# Patient Record
Sex: FEMALE | Race: ASIAN | NOT HISPANIC OR LATINO | URBAN - METROPOLITAN AREA
[De-identification: names, ages, dates, MRNs, and addresses within clinical notes are randomized per-mention and may not be internally consistent; named-entity substitution may affect disease eponyms.]

---

## 2017-01-02 ENCOUNTER — EMERGENCY (EMERGENCY)
Facility: HOSPITAL | Age: 42
LOS: 1 days | Discharge: HOME | End: 2017-01-02
Admitting: EMERGENCY MEDICINE
Payer: COMMERCIAL

## 2017-01-02 VITALS
HEART RATE: 105 BPM | SYSTOLIC BLOOD PRESSURE: 136 MMHG | DIASTOLIC BLOOD PRESSURE: 90 MMHG | RESPIRATION RATE: 19 BRPM | OXYGEN SATURATION: 100 % | TEMPERATURE: 99 F

## 2017-01-02 VITALS
HEART RATE: 100 BPM | OXYGEN SATURATION: 100 % | SYSTOLIC BLOOD PRESSURE: 137 MMHG | RESPIRATION RATE: 18 BRPM | DIASTOLIC BLOOD PRESSURE: 84 MMHG

## 2017-01-02 LAB — HCG SERPL-ACNC: < 5 MIU/ML — SIGNIFICANT CHANGE UP

## 2017-01-02 PROCEDURE — 71020: CPT | Mod: 26

## 2017-01-02 PROCEDURE — 99283 EMERGENCY DEPT VISIT LOW MDM: CPT | Mod: 25

## 2017-01-02 RX ADMIN — Medication 100 MILLIGRAM(S): at 09:51

## 2017-01-02 NOTE — ED PROVIDER NOTE - PLAN OF CARE
Rest, drink plenty of fluids.  Advance activity as tolerated.  Continue all previously prescribed medications as directed.  Continue using Albuterol Inhaler 2 puffs every 4-6 hours as needed for shortness of breath or wheezing.  Take Tessalon Perles 100 mg three times a day as needed for cough -- causes drowsiness, no drinking alcohol or driving with this medication. Take Guiafenesin 600 mg twice a day as needed for chest congestion. Follow up with your primary care physician and pulmonologist in 48-72 hours- bring copies of your results.  Return to the ER for worsening or persistent symptoms, including SOB, fevers, chills.

## 2017-01-02 NOTE — ED PROVIDER NOTE - MEDICAL DECISION MAKING DETAILS
42 y/o M w/ PMHx of asthma, presenting to the ED c/o cough x1 month. Likely bronchitis// CXR, supportive care, pulmonary follow up

## 2017-01-02 NOTE — ED ADULT NURSE NOTE - OBJECTIVE STATEMENT
Patient brought in by EMS due to nonproductive coughing since 4pm getting worse. Patient saw PCP received zpack, and Symbicort. Patient took prednisone, completed the dose and still not feeling well. Patient has a pulmonologist, that she follows with for her asthma.

## 2017-01-02 NOTE — ED PROVIDER NOTE - PROGRESS NOTE DETAILS
ALMA DELIA Cronin:  Xray shows no acute pathology.  Pt without wheezing.  Pt ambulating without difficulty or SOB.  Pt to follow up with her PMD and her private pulmonologist.  Pt medically stable for discharge. The scribe's documentation has been prepared under my direction and personally reviewed by me in its entirety. I confirm that the note above accurately reflects all work, treatment, procedures, and medical decision making performed by me, Jose Cronin PA-C.

## 2017-01-02 NOTE — ED ADULT NURSE NOTE - CHIEF COMPLAINT QUOTE
Pt brought in by EMS for nonproductive cough for approx 1.5 weeks.  Pt reports sick contacts at home.  Pt received prednisone taper last week, finished course but not feeling better.  Respirations even and unlabored on room air.  Mask provided in triage.  Pt denies any PMHx.

## 2017-01-02 NOTE — ED ADULT TRIAGE NOTE - CHIEF COMPLAINT QUOTE
Pt brought in by EMS for nonproductive cough for approx 1.5 weeks.  Pt reports sick contacts at home.  Pt received prednisone taper last week, finished course but not feeling better.  Respirations even and unlabored on room air.  Pt denies any PMHx. Pt brought in by EMS for nonproductive cough for approx 1.5 weeks.  Pt reports sick contacts at home.  Pt received prednisone taper last week, finished course but not feeling better.  Respirations even and unlabored on room air.  Mask provided in triage.  Pt denies any PMHx.

## 2017-01-02 NOTE — ED PROVIDER NOTE - NS ED MD SCRIBE ATTENDING SCRIBE SECTIONS
HISTORY OF PRESENT ILLNESS/VITAL SIGNS( Pullset)/REVIEW OF SYSTEMS/HIV/PHYSICAL EXAM/PAST MEDICAL/SURGICAL/SOCIAL HISTORY/DISPOSITION

## 2017-01-02 NOTE — ED PROVIDER NOTE - OBJECTIVE STATEMENT
42 y/o F w/ PMHx of asthma (No hx of intubation, no hospitalizations), , presents to the ED c/o cough x1 month. Pt endorses sick contact w/ children w/ URI sx (cough, runny nose, sore throat), after pt developed cough, runny nose, sore throat. Pt saw PMD Dr. Jj, started on Azithromycin w/ mild relief, 1.5 weeks ago saw pulmonologist Dr. Almonte, who started pt on Symbicort and prednisone (completed 1 week ago). Pt presenting to ED today for persistent cough. Pt states she has been taking flonase daily for past 3 weeks. Pt denies wheezing, but took albuterol inhaler today w/ no relief. Denies fever, chills, nausea,  vomiting, CP, SOB, abd pain, body aches or any other complaints. NKDA.

## 2017-01-02 NOTE — ED PROVIDER NOTE - CARE PLAN
Principal Discharge DX:	Bronchitis  Instructions for follow-up, activity and diet:	Rest, drink plenty of fluids.  Advance activity as tolerated.  Continue all previously prescribed medications as directed.  Continue using Albuterol Inhaler 2 puffs every 4-6 hours as needed for shortness of breath or wheezing.  Take Tessalon Perles 100 mg three times a day as needed for cough -- causes drowsiness, no drinking alcohol or driving with this medication. Take Guiafenesin 600 mg twice a day as needed for chest congestion. Follow up with your primary care physician and pulmonologist in 48-72 hours- bring copies of your results.  Return to the ER for worsening or persistent symptoms, including SOB, fevers, chills.

## 2017-03-09 PROBLEM — Z00.00 ENCOUNTER FOR PREVENTIVE HEALTH EXAMINATION: Status: ACTIVE | Noted: 2017-03-09

## 2018-02-21 NOTE — ED ADULT NURSE NOTE - TEMPLATE
After Visit Summary   2018    Radha Kebede    MRN: [id number]           Patient Information     Date Of Birth          1970        Visit Information        Provider Department      2018 1:00 PM Lynette Infante APRN [facility]        Today's Diagnoses     Symptomatic menopausal or female climacteric states    -  1    Vaginal dryness, menopausal          Care Instructions    Continue with Rx through Dr Magallanes as discussed  Rx for Estring sent to [facility]  Return in the fall for full  well woman visit and medication assessment                Follow-ups after your visit        Follow-up notes from your care team     Return in 1 year (on 2019) for Preventative Health Visit.      Who to contact     Please call your clinic at [phone] to:    Ask questions about your health    Make or cancel appointments    Discuss your medicines    Learn about your test results    Speak to your doctor            Additional Information About Your Visit        MyChart Information     [employer] is an electronic gateway that provides easy, online access to your medical records. With [employer]uki, you can request a clinic appointment, read your test results, renew a prescription or communicate with your care team.     To sign up for [employer] visit the website at [email or web address]   You will be asked to enter the access code listed below, as well as some personal information. Please follow the directions to create your username and password.     Your access code is: [email or web address]  Expires: 2018  2:10 PM     Your access code will  in 90 days. If you need help or a new code, please contact your [facility] or call [phone] for assistance.        Care EveryWhere ID     This is your Care EveryWhere ID. This could be used by other organizations to access your [facility] medical records  [id number]        Your Vitals Were      "Pulse Height BMI (Body Mass Index)             71 1.702 m (5' 7\") 23.34 kg/m2          Blood Pressure from Last 3 Encounters:   02/21/18 111/74   11/16/17 102/68    Weight from Last 3 Encounters:   02/21/18 67.6 kg (149 lb)   11/16/17 65.8 kg (145 lb)              Today, you had the following     No orders found for display         Today's Medication Changes          These changes are accurate as of 2/21/18  2:10 PM.  If you have any questions, ask your nurse or doctor.               These medicines have changed or have updated prescriptions.        Dose/Directions    * estradiol 10 MCG Tabs vaginal tablet   Commonly known as:  VAGIFEM   This may have changed:  Another medication with the same name was added. Make sure you understand how and when to take each.   Used for:  Premature ovarian failure, Hormone replacement therapy   Changed by:  Lynette Infante APRN CNP        Dose:  10 mcg   Place 1 tablet (10 mcg) vaginally twice a week   Quantity:  8 tablet   Refills:  3       * estradiol 2 MG vaginal ring   Commonly known as:  ESTRING   This may have changed:  You were already taking a medication with the same name, and this prescription was added. Make sure you understand how and when to take each.   Used for:  Symptomatic menopausal or female climacteric states, Vaginal dryness, menopausal   Changed by:  Lynette Infante APRN CNP        Dose:  2 mg   Place 1 each (2 mg) vaginally every 3 months   Quantity:  1 each   Refills:  3       * Notice:  This list has 2 medication(s) that are the same as other medications prescribed for you. Read the directions carefully, and ask your doctor or other care provider to review them with you.         Where to get your medicines      These medications were sent to [id number] - [facility] [address] [place] [address] [address] [facility] [address]     Phone:  [phone]     estradiol 2 MG vaginal ring                Primary Care " Provider    None Specified       No primary provider on file.        Equal Access to Services     VIJI FORD : Hadii meagan Last, jannet baldwin, willa fleming. So [facility] [phone].    ATENCIÓN: Si habla español, tiene a cameron disposición servicios gratuitos de asistencia lingüística. Llame al [phone].    We comply with applicable federal civil rights laws and [place] laws. We do not discriminate on the basis of race, color, national origin, age, disability, sex, sexual orientation, or gender identity.            Thank you!     Thank you for choosing [facility]  for your care. Our goal is always to provide you with excellent care. Hearing back from our patients is one way we can continue to improve our services. Please take a few minutes to complete the written survey that you may receive in the mail after your visit with us. Thank you!             Your Updated Medication List - Protect others around you: Learn how to safely use, store and throw away your medicines at www.disposemymeds.org.          This list is accurate as of 2/21/18  2:10 PM.  Always use your most recent med list.                   Brand Name Dispense Instructions for use Diagnosis    ascorbic acid 1000 MG Tabs    vitamin C     Take 1,000 mg by mouth daily        COMPOUND CONTAINING CONTROLLED SUBSTANCE - PHARMACY TO MIX COMPOUNDED MEDICATION    CMPD RX     1 capsule daily Progesterone 100 mg, estriol 4 MG        * estradiol 10 MCG Tabs vaginal tablet    VAGIFEM    8 tablet    Place 1 tablet (10 mcg) vaginally twice a week    Premature ovarian failure, Hormone replacement therapy       * estradiol 2 MG vaginal ring    ESTRING    1 each    Place 1 each (2 mg) vaginally every 3 months    Symptomatic menopausal or female climacteric states, Vaginal dryness, menopausal       fish oil-omega-3 fatty acids 1000 MG capsule      Take 1 g by mouth daily Nordic  natural  DHA        MAGNESIUM MALATE PO      Take 800 mg by mouth daily        MULTIVITAMIN ADULT PO           * NUTRITIONAL SUPPLEMENT PO      Take 5,000 mg by mouth MTH Folate/ Vitamin B12        * NUTRITIONAL SUPPLEMENT PO      Take 1 capsule by mouth MRF 2        * NUTRITIONAL SUPPLEMENT PO      Take 200 mg by mouth daily S-acetyl Glutathione        PROBIOTIC DAILY PO      Take 1 capsule by mouth daily        vitamin D3 2000 UNITS Caps           * Notice:  This list has 5 medication(s) that are the same as other medications prescribed for you. Read the directions carefully, and ask your doctor or other care provider to review them with you.       Respiratory

## 2019-01-14 PROBLEM — J45.909 UNSPECIFIED ASTHMA, UNCOMPLICATED: Chronic | Status: ACTIVE | Noted: 2017-01-02

## 2019-01-22 ENCOUNTER — APPOINTMENT (OUTPATIENT)
Dept: ANTEPARTUM | Facility: CLINIC | Age: 44
End: 2019-01-22
Payer: COMMERCIAL

## 2019-01-22 ENCOUNTER — ASOB RESULT (OUTPATIENT)
Age: 44
End: 2019-01-22

## 2019-01-22 PROCEDURE — 36416 COLLJ CAPILLARY BLOOD SPEC: CPT

## 2019-01-22 PROCEDURE — 76813 OB US NUCHAL MEAS 1 GEST: CPT

## 2019-01-22 PROCEDURE — 76801 OB US < 14 WKS SINGLE FETUS: CPT

## 2019-03-11 ENCOUNTER — ASOB RESULT (OUTPATIENT)
Age: 44
End: 2019-03-11

## 2019-03-11 ENCOUNTER — APPOINTMENT (OUTPATIENT)
Dept: ANTEPARTUM | Facility: CLINIC | Age: 44
End: 2019-03-11
Payer: COMMERCIAL

## 2019-03-11 PROCEDURE — 36415 COLL VENOUS BLD VENIPUNCTURE: CPT

## 2019-03-11 PROCEDURE — 76811 OB US DETAILED SNGL FETUS: CPT

## 2019-03-11 PROCEDURE — 99213 OFFICE O/P EST LOW 20 MIN: CPT | Mod: 25

## 2019-04-08 ENCOUNTER — ASOB RESULT (OUTPATIENT)
Age: 44
End: 2019-04-08

## 2019-04-08 ENCOUNTER — APPOINTMENT (OUTPATIENT)
Dept: ANTEPARTUM | Facility: CLINIC | Age: 44
End: 2019-04-08
Payer: COMMERCIAL

## 2019-04-08 PROCEDURE — 99213 OFFICE O/P EST LOW 20 MIN: CPT | Mod: 25

## 2019-04-08 PROCEDURE — 76805 OB US >/= 14 WKS SNGL FETUS: CPT

## 2019-05-05 ENCOUNTER — OUTPATIENT (OUTPATIENT)
Dept: INPATIENT UNIT | Facility: HOSPITAL | Age: 44
LOS: 1 days | Discharge: HOME | End: 2019-05-05
Payer: COMMERCIAL

## 2019-05-05 VITALS — TEMPERATURE: 98 F

## 2019-05-05 VITALS
HEART RATE: 98 BPM | RESPIRATION RATE: 16 BRPM | TEMPERATURE: 98 F | SYSTOLIC BLOOD PRESSURE: 126 MMHG | DIASTOLIC BLOOD PRESSURE: 67 MMHG

## 2019-05-05 DIAGNOSIS — Z3A.00 WEEKS OF GESTATION OF PREGNANCY NOT SPECIFIED: ICD-10-CM

## 2019-05-05 DIAGNOSIS — O26.899 OTHER SPECIFIED PREGNANCY RELATED CONDITIONS, UNSPECIFIED TRIMESTER: ICD-10-CM

## 2019-05-05 LAB
APPEARANCE UR: CLEAR — SIGNIFICANT CHANGE UP
BACTERIA # UR AUTO: NEGATIVE — SIGNIFICANT CHANGE UP
BILIRUB UR-MCNC: NEGATIVE — SIGNIFICANT CHANGE UP
BLOOD UR QL VISUAL: NEGATIVE — SIGNIFICANT CHANGE UP
COLOR SPEC: COLORLESS — SIGNIFICANT CHANGE UP
GLUCOSE UR-MCNC: NEGATIVE — SIGNIFICANT CHANGE UP
HCT VFR BLD CALC: 34.9 % — SIGNIFICANT CHANGE UP (ref 34.5–45)
HGB BLD-MCNC: 10.8 G/DL — LOW (ref 11.5–15.5)
HYALINE CASTS # UR AUTO: NEGATIVE — SIGNIFICANT CHANGE UP
KETONES UR-MCNC: NEGATIVE — SIGNIFICANT CHANGE UP
LEUKOCYTE ESTERASE UR-ACNC: SIGNIFICANT CHANGE UP
MCHC RBC-ENTMCNC: 23.3 PG — LOW (ref 27–34)
MCHC RBC-ENTMCNC: 30.9 % — LOW (ref 32–36)
MCV RBC AUTO: 75.4 FL — LOW (ref 80–100)
NITRITE UR-MCNC: NEGATIVE — SIGNIFICANT CHANGE UP
NRBC # FLD: 0 K/UL — SIGNIFICANT CHANGE UP (ref 0–0)
PH UR: 7.5 — SIGNIFICANT CHANGE UP (ref 5–8)
PLATELET # BLD AUTO: 211 K/UL — SIGNIFICANT CHANGE UP (ref 150–400)
PMV BLD: SIGNIFICANT CHANGE UP FL (ref 7–13)
PROT UR-MCNC: NEGATIVE — SIGNIFICANT CHANGE UP
RBC # BLD: 4.63 M/UL — SIGNIFICANT CHANGE UP (ref 3.8–5.2)
RBC # FLD: 13.7 % — SIGNIFICANT CHANGE UP (ref 10.3–14.5)
RBC CASTS # UR COMP ASSIST: SIGNIFICANT CHANGE UP (ref 0–?)
SP GR SPEC: 1.01 — SIGNIFICANT CHANGE UP (ref 1–1.04)
SQUAMOUS # UR AUTO: SIGNIFICANT CHANGE UP
UROBILINOGEN FLD QL: NORMAL — SIGNIFICANT CHANGE UP
WBC # BLD: 13.41 K/UL — HIGH (ref 3.8–10.5)
WBC # FLD AUTO: 13.41 K/UL — HIGH (ref 3.8–10.5)
WBC UR QL: HIGH (ref 0–?)

## 2019-05-05 PROCEDURE — 59025 FETAL NON-STRESS TEST: CPT | Mod: 26

## 2019-05-05 NOTE — OB PROVIDER TRIAGE NOTE - NSOBPROVIDERNOTE_OBGYN_ALL_OB_FT
44 year old female  at 27.6 wks  VB   polyp noted on exam    no acute VB   cx length normal      occassional contrxs     case d/w Dr Guerrero      will send CBC   po hydration and monitoring 44 year old female  at 27.6 wks  VB   polyp noted on exam    no acute VB   cx length normal      occassional contrxs     case d/w Dr Guerrero      will send CBC   po hydration and monitoring   1530p CBC normal WBC   UA negative    feels better no VB    discharge home with instructions   KERRY FLANNERY

## 2019-05-05 NOTE — OB PROVIDER TRIAGE NOTE - HISTORY OF PRESENT ILLNESS
44 year old female P2 EDC 7/29/19 ay 27.6 wks who presents from office after being seen for vaginal spotting which has been intermittent for last 10 days and denied any saturated pads/clothing    also feels intermittent cramping since spotting began and denied any contractions denied any prior hx of PTL  denied any hx of placental issues.    denied any prior hx of polyp  noted on exam today    phone consult with Dr Guerrero  noted to be 4cm and protruding thru cx  wants MFM consult     pt denied any fever chills dysuria   denied any vaginal discharge except brownish spotting especially at night  or odor      OB hx CSx2

## 2019-05-05 NOTE — OB PROVIDER TRIAGE NOTE - NSHPPHYSICALEXAM_GEN_ALL_CORE
pt seen    SSE  4cm polyp noted protruding thru os  no active vb noted    TVS  cx length  3.7 cm   no previa    abd scan vtx aafi bpp 8/8     nst irreg contrxs      examined with Dr Garcia

## 2019-05-05 NOTE — OB PROVIDER TRIAGE NOTE - FINDINGS/TREATMENT
cx polyp noted  no active Vb  for continued  observation continued observation and monitoring    sonogram follow up 5/6/19

## 2019-05-06 ENCOUNTER — ASOB RESULT (OUTPATIENT)
Age: 44
End: 2019-05-06

## 2019-05-06 ENCOUNTER — APPOINTMENT (OUTPATIENT)
Dept: ANTEPARTUM | Facility: CLINIC | Age: 44
End: 2019-05-06
Payer: COMMERCIAL

## 2019-05-06 ENCOUNTER — OUTPATIENT (OUTPATIENT)
Dept: OUTPATIENT SERVICES | Facility: HOSPITAL | Age: 44
LOS: 1 days | End: 2019-05-06

## 2019-05-06 PROCEDURE — 76805 OB US >/= 14 WKS SNGL FETUS: CPT

## 2019-05-06 PROCEDURE — 76817 TRANSVAGINAL US OBSTETRIC: CPT

## 2019-05-06 PROCEDURE — 76818 FETAL BIOPHYS PROFILE W/NST: CPT | Mod: 26

## 2019-05-13 ENCOUNTER — APPOINTMENT (OUTPATIENT)
Dept: ANTEPARTUM | Facility: CLINIC | Age: 44
End: 2019-05-13
Payer: COMMERCIAL

## 2019-05-13 ENCOUNTER — ASOB RESULT (OUTPATIENT)
Age: 44
End: 2019-05-13

## 2019-05-13 ENCOUNTER — APPOINTMENT (OUTPATIENT)
Dept: ANTEPARTUM | Facility: HOSPITAL | Age: 44
End: 2019-05-13

## 2019-05-13 ENCOUNTER — OUTPATIENT (OUTPATIENT)
Dept: OUTPATIENT SERVICES | Facility: HOSPITAL | Age: 44
LOS: 1 days | End: 2019-05-13

## 2019-05-13 PROCEDURE — 76818 FETAL BIOPHYS PROFILE W/NST: CPT | Mod: 26

## 2019-05-16 DIAGNOSIS — O99.112 OTHER DISEASES OF THE BLOOD AND BLOOD-FORMING ORGANS AND CERTAIN DISORDERS INVOLVING THE IMMUNE MECHANISM COMPLICATING PREGNANCY, SECOND TRIMESTER: ICD-10-CM

## 2019-05-16 DIAGNOSIS — O34.43 MATERNAL CARE FOR OTHER ABNORMALITIES OF CERVIX, THIRD TRIMESTER: ICD-10-CM

## 2019-05-16 DIAGNOSIS — O09.522 SUPERVISION OF ELDERLY MULTIGRAVIDA, SECOND TRIMESTER: ICD-10-CM

## 2019-05-16 DIAGNOSIS — O40.3XX0 POLYHYDRAMNIOS, THIRD TRIMESTER, NOT APPLICABLE OR UNSPECIFIED: ICD-10-CM

## 2019-05-21 ENCOUNTER — APPOINTMENT (OUTPATIENT)
Dept: ANTEPARTUM | Facility: HOSPITAL | Age: 44
End: 2019-05-21

## 2019-05-21 ENCOUNTER — APPOINTMENT (OUTPATIENT)
Dept: MATERNAL FETAL MEDICINE | Facility: CLINIC | Age: 44
End: 2019-05-21
Payer: COMMERCIAL

## 2019-05-21 ENCOUNTER — APPOINTMENT (OUTPATIENT)
Dept: ANTEPARTUM | Facility: CLINIC | Age: 44
End: 2019-05-21
Payer: COMMERCIAL

## 2019-05-21 ENCOUNTER — ASOB RESULT (OUTPATIENT)
Age: 44
End: 2019-05-21

## 2019-05-21 ENCOUNTER — OUTPATIENT (OUTPATIENT)
Dept: OUTPATIENT SERVICES | Facility: HOSPITAL | Age: 44
LOS: 1 days | End: 2019-05-21

## 2019-05-21 DIAGNOSIS — O40.3XX0 POLYHYDRAMNIOS, THIRD TRIMESTER, NOT APPLICABLE OR UNSPECIFIED: ICD-10-CM

## 2019-05-21 DIAGNOSIS — O34.43 MATERNAL CARE FOR OTHER ABNORMALITIES OF CERVIX, THIRD TRIMESTER: ICD-10-CM

## 2019-05-21 DIAGNOSIS — O09.522 SUPERVISION OF ELDERLY MULTIGRAVIDA, SECOND TRIMESTER: ICD-10-CM

## 2019-05-21 DIAGNOSIS — O24.410 GESTATIONAL DIABETES MELLITUS IN PREGNANCY, DIET CONTROLLED: ICD-10-CM

## 2019-05-21 PROCEDURE — 76818 FETAL BIOPHYS PROFILE W/NST: CPT | Mod: 26

## 2019-05-21 PROCEDURE — G0109 DIAB MANAGE TRN IND/GROUP: CPT

## 2019-05-22 RX ORDER — BLOOD-GLUCOSE METER
KIT MISCELLANEOUS
Qty: 1 | Refills: 0 | Status: ACTIVE | COMMUNITY
Start: 2019-05-21 | End: 1900-01-01

## 2019-05-22 RX ORDER — LANCETS 33 GAUGE
EACH MISCELLANEOUS
Qty: 4 | Refills: 0 | Status: ACTIVE | COMMUNITY
Start: 2019-05-21 | End: 1900-01-01

## 2019-05-22 RX ORDER — BLOOD-GLUCOSE METER
KIT MISCELLANEOUS 4 TIMES DAILY
Qty: 4 | Refills: 0 | Status: ACTIVE | COMMUNITY
Start: 2019-05-21 | End: 1900-01-01

## 2019-05-28 ENCOUNTER — ASOB RESULT (OUTPATIENT)
Age: 44
End: 2019-05-28

## 2019-05-28 ENCOUNTER — APPOINTMENT (OUTPATIENT)
Dept: ANTEPARTUM | Facility: HOSPITAL | Age: 44
End: 2019-05-28

## 2019-05-28 ENCOUNTER — OUTPATIENT (OUTPATIENT)
Dept: OUTPATIENT SERVICES | Facility: HOSPITAL | Age: 44
LOS: 1 days | End: 2019-05-28

## 2019-05-28 ENCOUNTER — APPOINTMENT (OUTPATIENT)
Dept: ANTEPARTUM | Facility: CLINIC | Age: 44
End: 2019-05-28
Payer: COMMERCIAL

## 2019-05-28 PROCEDURE — 76816 OB US FOLLOW-UP PER FETUS: CPT

## 2019-05-28 PROCEDURE — 76818 FETAL BIOPHYS PROFILE W/NST: CPT | Mod: 26

## 2019-06-04 DIAGNOSIS — O35.8XX0 MATERNAL CARE FOR OTHER (SUSPECTED) FETAL ABNORMALITY AND DAMAGE, NOT APPLICABLE OR UNSPECIFIED: ICD-10-CM

## 2019-06-04 DIAGNOSIS — O99.112 OTHER DISEASES OF THE BLOOD AND BLOOD-FORMING ORGANS AND CERTAIN DISORDERS INVOLVING THE IMMUNE MECHANISM COMPLICATING PREGNANCY, SECOND TRIMESTER: ICD-10-CM

## 2019-06-04 DIAGNOSIS — O99.213 OBESITY COMPLICATING PREGNANCY, THIRD TRIMESTER: ICD-10-CM

## 2019-06-04 DIAGNOSIS — O40.3XX0 POLYHYDRAMNIOS, THIRD TRIMESTER, NOT APPLICABLE OR UNSPECIFIED: ICD-10-CM

## 2019-06-04 DIAGNOSIS — O09.522 SUPERVISION OF ELDERLY MULTIGRAVIDA, SECOND TRIMESTER: ICD-10-CM

## 2019-06-05 ENCOUNTER — ASOB RESULT (OUTPATIENT)
Age: 44
End: 2019-06-05

## 2019-06-05 ENCOUNTER — OUTPATIENT (OUTPATIENT)
Dept: OUTPATIENT SERVICES | Facility: HOSPITAL | Age: 44
LOS: 1 days | Discharge: HOME | End: 2019-06-05
Payer: COMMERCIAL

## 2019-06-05 ENCOUNTER — APPOINTMENT (OUTPATIENT)
Dept: ANTEPARTUM | Facility: HOSPITAL | Age: 44
End: 2019-06-05

## 2019-06-05 VITALS
RESPIRATION RATE: 18 BRPM | HEART RATE: 92 BPM | SYSTOLIC BLOOD PRESSURE: 143 MMHG | DIASTOLIC BLOOD PRESSURE: 69 MMHG | TEMPERATURE: 98 F

## 2019-06-05 VITALS — OXYGEN SATURATION: 100 % | HEART RATE: 90 BPM

## 2019-06-05 DIAGNOSIS — O26.899 OTHER SPECIFIED PREGNANCY RELATED CONDITIONS, UNSPECIFIED TRIMESTER: ICD-10-CM

## 2019-06-05 DIAGNOSIS — Z3A.00 WEEKS OF GESTATION OF PREGNANCY NOT SPECIFIED: ICD-10-CM

## 2019-06-05 LAB
APPEARANCE UR: CLEAR — SIGNIFICANT CHANGE UP
BACTERIA # UR AUTO: SIGNIFICANT CHANGE UP
BILIRUB UR-MCNC: NEGATIVE — SIGNIFICANT CHANGE UP
BLOOD UR QL VISUAL: HIGH
COLOR SPEC: SIGNIFICANT CHANGE UP
GLUCOSE UR-MCNC: NEGATIVE — SIGNIFICANT CHANGE UP
HYALINE CASTS # UR AUTO: NEGATIVE — SIGNIFICANT CHANGE UP
KETONES UR-MCNC: NEGATIVE — SIGNIFICANT CHANGE UP
LEUKOCYTE ESTERASE UR-ACNC: SIGNIFICANT CHANGE UP
NITRITE UR-MCNC: NEGATIVE — SIGNIFICANT CHANGE UP
PH UR: 7 — SIGNIFICANT CHANGE UP (ref 5–8)
PROT UR-MCNC: NEGATIVE — SIGNIFICANT CHANGE UP
RBC CASTS # UR COMP ASSIST: SIGNIFICANT CHANGE UP (ref 0–?)
SP GR SPEC: 1.01 — SIGNIFICANT CHANGE UP (ref 1–1.04)
SQUAMOUS # UR AUTO: SIGNIFICANT CHANGE UP
UROBILINOGEN FLD QL: NORMAL — SIGNIFICANT CHANGE UP
WBC UR QL: HIGH (ref 0–?)

## 2019-06-05 PROCEDURE — 93010 ELECTROCARDIOGRAM REPORT: CPT

## 2019-06-05 RX ORDER — SODIUM CHLORIDE 9 MG/ML
1000 INJECTION, SOLUTION INTRAVENOUS
Refills: 0 | Status: DISCONTINUED | OUTPATIENT
Start: 2019-06-05 | End: 2019-06-20

## 2019-06-05 NOTE — OB PROVIDER TRIAGE NOTE - NSHPPHYSICALEXAM_GEN_ALL_CORE
Assessment reveals VSS  Abdomen soft, NT, gravid   Gold ball sized pendulous polyp protruding from introitus     Dr. Salcedo at bedside evaluating pt  VE: closed and long      PLAN: r/o PTL           NST           ATU sonogram           Finger stick-106           Fluid

## 2019-06-05 NOTE — OB PROVIDER TRIAGE NOTE - NS_OBGYNHISTORY_OBGYN_ALL_OB_FT
GYN: cervical polyp dx 5/5/19  OB: C/S 3/20/02, FT, 8#, failure to decend       C/S 5/26/2006, FT, 5#, repeat in labor at 37wks      AP course complicated by GDMA1-Metformin   Poly of Bryanna 29 as per patient last week

## 2019-06-05 NOTE — CONSULT NOTE ADULT - ASSESSMENT
EKG - pending   Echo  - Normal LV function ( out patient )       a/p     1) Palpitations - heart rate normal , will get 12 lead EKG, likely benign, no presyncope or syncope, 2d echo 4 months ago shows normal LV function     2) Vaginal bleed - likely sec to polyp f/u OB     3) Contractions - OB ruling out  labor

## 2019-06-05 NOTE — OB PROVIDER TRIAGE NOTE - HISTORY OF PRESENT ILLNESS
43y/o  @32.2wks presents with "spotting from polyp that is coming out when she wiped". Pt also reports on and off abdominal pain [id number]. Pain scale 5/10, feels them [id number].  Reports good fetal movement.  Denies LOF and VB    Allergies: Denies  Medications: PNV, Metformin 500 QD    Medical HX: Asthma, no hospitalizations or intubations  Surgical HX: C/S x2  Denies PSY/Etoh/Drugs/Smoke

## 2019-06-05 NOTE — OB PROVIDER TRIAGE NOTE - NSOBPROVIDERNOTE_OBGYN_ALL_OB_FT
Dr. Sierra at bedside, cardiology, came because he saw his patient on census. pt has hx of palpitations, ekg requested, WNL

## 2019-06-05 NOTE — CONSULT NOTE ADULT - SUBJECTIVE AND OBJECTIVE BOX
Dandre Escalante MD  Interventional Cardiology / Advance Heart Failure and Cardiac Transplant Specialist  [place] Office : [address] [place] [address] [place] [address]  Tel:   [place] Office : [address] [facility] [place] [address]  Tel: [phone]  Cell : [phone]    HISTORY OF PRESENT ILLNESS:  Pt is a 43 yo female comes in for vaginal bleeding, sec to possible cervical polyp , also complains of contractions. Pt came to me as an out pt in march, sec to SOB, workup including 2D echo was normal , pt currently complains of some palpitations, no presyncope , no dizziness, no SOB no chest pains      PAST MEDICAL & SURGICAL HISTORY:    	    MEDICATIONS:              lactated ringers. 1000 milliLiter(s) IV Continuous <Continuous>      FAMILY HISTORY:        Allergies    No Known Allergies    Intolerances    	      PHYSICAL EXAM:  T(C): 36.5 (06-05-19 @ 10:16), Max: 36.5 (06-05-19 @ 10:09)  HR: 80 (06-05-19 @ 12:54) (73 - 92)  BP: 133/68 (06-05-19 @ 12:51) (105/72 - 143/69)  RR: 18 (06-05-19 @ 10:09) (18 - 18)  SpO2: 100% (06-05-19 @ 12:49) (100% - 100%)  Wt(kg): --  I&O's Summary        HEENT:   Normal oral mucosa, PERRL, EOMI	  Cardiovascular: Normal S1 S2, No JVD, No murmurs, No edema  Respiratory: Lungs clear to auscultation	  Gastrointestinal: gravid uterus  Extremities: no edema    LABS:	 	    CARDIAC MARKERS:                    proBNP:   Lipid Profile:   HgA1c:   TSH:     ASSESSMENT/PLAN:

## 2019-06-05 NOTE — OB PROVIDER TRIAGE NOTE - FINDINGS/TREATMENT
D/C Home  D/W Dr. Cornell  Enlarged cervical polyp  Normal fetal testing  No evidence of PTL at this time  Follow up at next prenatal visit either this Sunday or Tuesday for follow up of polyp  Return for vaginal bleeding, loss of fluid or decreased fetal movement  Signs and symptoms of labor reviewed

## 2019-06-05 NOTE — OB PROVIDER TRIAGE NOTE - ADDITIONAL INSTRUCTIONS
Follow up at next prenatal visit either this Sunday or Tuesday for follow up of polyp  Return for vaginal bleeding, loss of fluid or decreased fetal movement  Signs and symptoms of labor reviewed

## 2019-06-05 NOTE — OB PROVIDER TRIAGE NOTE - NSHPLABSRESULTS_GEN_ALL_CORE
Urinalysis Basic - ( 2019 10:45 )    Color: LIGHT YELLOW / Appearance: CLEAR / S.007 / pH: 7.0  Gluc: NEGATIVE / Ketone: NEGATIVE  / Bili: NEGATIVE / Urobili: NORMAL   Blood: MODERATE / Protein: NEGATIVE / Nitrite: NEGATIVE   Leuk Esterase: LARGE / RBC: 3-5 / WBC 6-10   Sq Epi: FEW / Non Sq Epi: x / Bacteria: FEW        Ultrasound ATU: bpp 8/8, briana 22

## 2019-06-06 LAB — SPECIMEN SOURCE: SIGNIFICANT CHANGE UP

## 2019-06-07 DIAGNOSIS — O09.522 SUPERVISION OF ELDERLY MULTIGRAVIDA, SECOND TRIMESTER: ICD-10-CM

## 2019-06-07 DIAGNOSIS — O34.43 MATERNAL CARE FOR OTHER ABNORMALITIES OF CERVIX, THIRD TRIMESTER: ICD-10-CM

## 2019-06-07 DIAGNOSIS — O34.211 MATERNAL CARE FOR LOW TRANSVERSE SCAR FROM PREVIOUS CESAREAN DELIVERY: ICD-10-CM

## 2019-06-07 DIAGNOSIS — O35.8XX0 MATERNAL CARE FOR OTHER (SUSPECTED) FETAL ABNORMALITY AND DAMAGE, NOT APPLICABLE OR UNSPECIFIED: ICD-10-CM

## 2019-06-07 DIAGNOSIS — O24.415 GESTATIONAL DIABETES MELLITUS IN PREGNANCY, CONTROLLED BY ORAL HYPOGLYCEMIC DRUGS: ICD-10-CM

## 2019-06-07 DIAGNOSIS — O40.3XX0 POLYHYDRAMNIOS, THIRD TRIMESTER, NOT APPLICABLE OR UNSPECIFIED: ICD-10-CM

## 2019-06-07 LAB
-  CEFAZOLIN: SIGNIFICANT CHANGE UP
-  CIPROFLOXACIN: SIGNIFICANT CHANGE UP
-  GENTAMICIN: SIGNIFICANT CHANGE UP
-  LEVOFLOXACIN: SIGNIFICANT CHANGE UP
-  OXACILLIN: SIGNIFICANT CHANGE UP
-  PENICILLIN: SIGNIFICANT CHANGE UP
-  RIFAMPIN.: SIGNIFICANT CHANGE UP
-  TETRACYCLINE: SIGNIFICANT CHANGE UP
-  TRIMETHOPRIM/SULFAMETHOXAZOLE: SIGNIFICANT CHANGE UP
-  VANCOMYCIN: SIGNIFICANT CHANGE UP
BACTERIA UR CULT: SIGNIFICANT CHANGE UP
METHOD TYPE: SIGNIFICANT CHANGE UP
ORGANISM # SPEC MICROSCOPIC CNT: SIGNIFICANT CHANGE UP
ORGANISM # SPEC MICROSCOPIC CNT: SIGNIFICANT CHANGE UP

## 2019-06-10 ENCOUNTER — APPOINTMENT (OUTPATIENT)
Dept: ANTEPARTUM | Facility: HOSPITAL | Age: 44
End: 2019-06-10

## 2019-06-10 ENCOUNTER — OUTPATIENT (OUTPATIENT)
Dept: OUTPATIENT SERVICES | Facility: HOSPITAL | Age: 44
LOS: 1 days | End: 2019-06-10

## 2019-06-10 ENCOUNTER — APPOINTMENT (OUTPATIENT)
Dept: ANTEPARTUM | Facility: CLINIC | Age: 44
End: 2019-06-10
Payer: COMMERCIAL

## 2019-06-10 ENCOUNTER — ASOB RESULT (OUTPATIENT)
Age: 44
End: 2019-06-10

## 2019-06-10 PROCEDURE — 76818 FETAL BIOPHYS PROFILE W/NST: CPT | Mod: 26

## 2019-06-14 DIAGNOSIS — O10.013 PRE-EXISTING ESSENTIAL HYPERTENSION COMPLICATING PREGNANCY, THIRD TRIMESTER: ICD-10-CM

## 2019-06-14 DIAGNOSIS — O41.8X30 OTHER SPECIFIED DISORDERS OF AMNIOTIC FLUID AND MEMBRANES, THIRD TRIMESTER, NOT APPLICABLE OR UNSPECIFIED: ICD-10-CM

## 2019-06-14 DIAGNOSIS — O09.523 SUPERVISION OF ELDERLY MULTIGRAVIDA, THIRD TRIMESTER: ICD-10-CM

## 2019-06-14 DIAGNOSIS — O34.43 MATERNAL CARE FOR OTHER ABNORMALITIES OF CERVIX, THIRD TRIMESTER: ICD-10-CM

## 2019-06-14 DIAGNOSIS — O24.415 GESTATIONAL DIABETES MELLITUS IN PREGNANCY, CONTROLLED BY ORAL HYPOGLYCEMIC DRUGS: ICD-10-CM

## 2019-06-17 ENCOUNTER — OUTPATIENT (OUTPATIENT)
Dept: OUTPATIENT SERVICES | Facility: HOSPITAL | Age: 44
LOS: 1 days | End: 2019-06-17

## 2019-06-17 ENCOUNTER — APPOINTMENT (OUTPATIENT)
Dept: ANTEPARTUM | Facility: HOSPITAL | Age: 44
End: 2019-06-17

## 2019-06-17 ENCOUNTER — ASOB RESULT (OUTPATIENT)
Age: 44
End: 2019-06-17

## 2019-06-17 ENCOUNTER — APPOINTMENT (OUTPATIENT)
Dept: ANTEPARTUM | Facility: CLINIC | Age: 44
End: 2019-06-17
Payer: COMMERCIAL

## 2019-06-17 PROCEDURE — 76818 FETAL BIOPHYS PROFILE W/NST: CPT | Mod: 26

## 2019-06-17 PROCEDURE — 76816 OB US FOLLOW-UP PER FETUS: CPT

## 2019-06-18 ENCOUNTER — RESULT REVIEW (OUTPATIENT)
Age: 44
End: 2019-06-18

## 2019-06-19 ENCOUNTER — INPATIENT (INPATIENT)
Facility: HOSPITAL | Age: 44
LOS: 3 days | Discharge: HOME | End: 2019-06-23
Attending: OBSTETRICS & GYNECOLOGY | Admitting: OBSTETRICS & GYNECOLOGY
Payer: COMMERCIAL

## 2019-06-19 ENCOUNTER — TRANSCRIPTION ENCOUNTER (OUTPATIENT)
Age: 44
End: 2019-06-19

## 2019-06-19 VITALS
HEART RATE: 91 BPM | WEIGHT: 181.88 LBS | SYSTOLIC BLOOD PRESSURE: 142 MMHG | HEIGHT: 60 IN | TEMPERATURE: 98 F | DIASTOLIC BLOOD PRESSURE: 69 MMHG | RESPIRATION RATE: 18 BRPM

## 2019-06-19 DIAGNOSIS — Z3A.00 WEEKS OF GESTATION OF PREGNANCY NOT SPECIFIED: ICD-10-CM

## 2019-06-19 DIAGNOSIS — O26.899 OTHER SPECIFIED PREGNANCY RELATED CONDITIONS, UNSPECIFIED TRIMESTER: ICD-10-CM

## 2019-06-19 PROCEDURE — 88302 TISSUE EXAM BY PATHOLOGIST: CPT | Mod: 26

## 2019-06-19 RX ORDER — SODIUM CHLORIDE 9 MG/ML
1000 INJECTION, SOLUTION INTRAVENOUS
Refills: 0 | Status: DISCONTINUED | OUTPATIENT
Start: 2019-06-19 | End: 2019-06-19

## 2019-06-19 RX ORDER — IBUPROFEN 200 MG
600 TABLET ORAL EVERY 6 HOURS
Refills: 0 | Status: DISCONTINUED | OUTPATIENT
Start: 2019-06-21 | End: 2019-06-22

## 2019-06-19 RX ORDER — OXYTOCIN 10 UNIT/ML
333.33 VIAL (ML) INJECTION
Qty: 20 | Refills: 0 | Status: DISCONTINUED | OUTPATIENT
Start: 2019-06-19 | End: 2019-06-20

## 2019-06-19 RX ORDER — OXYCODONE HYDROCHLORIDE 5 MG/1
5 TABLET ORAL ONCE
Refills: 0 | Status: DISCONTINUED | OUTPATIENT
Start: 2019-06-21 | End: 2019-06-22

## 2019-06-19 RX ORDER — OXYCODONE HYDROCHLORIDE 5 MG/1
5 TABLET ORAL
Refills: 0 | Status: DISCONTINUED | OUTPATIENT
Start: 2019-06-21 | End: 2019-06-22

## 2019-06-19 RX ORDER — CITRIC ACID/SODIUM CITRATE 300-500 MG
30 SOLUTION, ORAL ORAL ONCE
Refills: 0 | Status: COMPLETED | OUTPATIENT
Start: 2019-06-19 | End: 2019-06-20

## 2019-06-19 RX ORDER — SODIUM CHLORIDE 9 MG/ML
1000 INJECTION, SOLUTION INTRAVENOUS ONCE
Refills: 0 | Status: COMPLETED | OUTPATIENT
Start: 2019-06-19 | End: 2019-06-20

## 2019-06-19 RX ORDER — METOCLOPRAMIDE HCL 10 MG
10 TABLET ORAL ONCE
Refills: 0 | Status: COMPLETED | OUTPATIENT
Start: 2019-06-19 | End: 2019-06-20

## 2019-06-19 RX ORDER — SODIUM CHLORIDE 9 MG/ML
1000 INJECTION, SOLUTION INTRAVENOUS
Refills: 0 | Status: DISCONTINUED | OUTPATIENT
Start: 2019-06-19 | End: 2019-06-20

## 2019-06-19 RX ORDER — SODIUM CHLORIDE 9 MG/ML
1000 INJECTION, SOLUTION INTRAVENOUS ONCE
Refills: 0 | Status: DISCONTINUED | OUTPATIENT
Start: 2019-06-19 | End: 2019-06-19

## 2019-06-19 RX ORDER — FAMOTIDINE 10 MG/ML
20 INJECTION INTRAVENOUS ONCE
Refills: 0 | Status: COMPLETED | OUTPATIENT
Start: 2019-06-19 | End: 2019-06-20

## 2019-06-19 RX ORDER — AMPICILLIN TRIHYDRATE 250 MG
2 CAPSULE ORAL EVERY 6 HOURS
Refills: 0 | Status: DISCONTINUED | OUTPATIENT
Start: 2019-06-19 | End: 2019-06-20

## 2019-06-19 RX ORDER — TETANUS TOXOID, REDUCED DIPHTHERIA TOXOID AND ACELLULAR PERTUSSIS VACCINE, ADSORBED 5; 2.5; 8; 8; 2.5 [IU]/.5ML; [IU]/.5ML; UG/.5ML; UG/.5ML; UG/.5ML
0.5 SUSPENSION INTRAMUSCULAR ONCE
Refills: 0 | Status: DISCONTINUED | OUTPATIENT
Start: 2019-06-21 | End: 2019-06-22

## 2019-06-19 RX ORDER — CITRIC ACID/SODIUM CITRATE 300-500 MG
30 SOLUTION, ORAL ORAL ONCE
Refills: 0 | Status: DISCONTINUED | OUTPATIENT
Start: 2019-06-19 | End: 2019-06-19

## 2019-06-19 RX ORDER — FAMOTIDINE 10 MG/ML
20 INJECTION INTRAVENOUS ONCE
Refills: 0 | Status: DISCONTINUED | OUTPATIENT
Start: 2019-06-19 | End: 2019-06-19

## 2019-06-19 RX ORDER — METOCLOPRAMIDE HCL 10 MG
10 TABLET ORAL ONCE
Refills: 0 | Status: DISCONTINUED | OUTPATIENT
Start: 2019-06-19 | End: 2019-06-19

## 2019-06-19 RX ORDER — OXYTOCIN 10 UNIT/ML
VIAL (ML) INJECTION
Qty: 20 | Refills: 0 | Status: DISCONTINUED | OUTPATIENT
Start: 2019-06-19 | End: 2019-06-19

## 2019-06-19 RX ORDER — SODIUM CHLORIDE 9 MG/ML
1000 INJECTION INTRAMUSCULAR; INTRAVENOUS; SUBCUTANEOUS
Refills: 0 | Status: DISCONTINUED | OUTPATIENT
Start: 2019-06-19 | End: 2019-06-20

## 2019-06-19 RX ADMIN — SODIUM CHLORIDE 125 MILLILITER(S): 9 INJECTION, SOLUTION INTRAVENOUS at 21:55

## 2019-06-19 RX ADMIN — Medication 216 GRAM(S): at 20:34

## 2019-06-19 RX ADMIN — Medication 12 MILLIGRAM(S): at 20:34

## 2019-06-19 NOTE — OB PROVIDER H&P - ASSESSMENT
45 y/o  @ 34.2 wks gest presents with c/o LOF since 0700 states leaking mod amt clear odorless fluid throughout the day . pt c/o tightening " on and off" denies any VB reports +Fm denies any n/v/d denies any fever or chills denies any headaches visual disturbances otr right upper epigastric pain . ap care comp by : AMA , pt sched for a rpt  on 2019, GDMA2 - metformin 500 mg po hs otherwise no other ap care comp at this time.        abdomen: soft, nt on palp  SSE: + pooling  small amt clear odorless amniotic fluid   + nitrazine  SVE: 0.5/50/-3  T(C): --  HR: 91 (19 @ 18:31) (91 - 91)  BP: 142/69 (19 @ 18:31) (142/69 - 142/69)  RR: 18 (19 @ 18:31) (18 - 18)  SpO2: --  FS- 84  GBS- unknown  cat 1 FHT  toco: irreg  last meal @ 1700  d/w dr haddad/ dr rudolph  d/w dr chiang  admit to l&d  PPROM @ 34.2 wks gest GDMA2 / Amp / Beta/ PEC labs / for rpt / BTL  see admission orders        NKA  med hx:   seasonal asthma - no hosp no intubations  surg hx:   c- section x's 2   gyn hx: cervical polyp  ob hx:   3/20/2002 primary  arrest of dilitation 8#  2006 rpt  @ 39 wks 6#   meds:   PNV  Metformin 500 mg po hs 45 y/o  @ 34.2 wks gest presents with c/o LOF since 0700 states leaking mod amt clear odorless fluid throughout the day . pt c/o tightening " on and off" denies any VB reports +Fm denies any n/v/d denies any fever or chills denies any headaches visual disturbances otr right upper epigastric pain . ap care comp by : AMA , pt sched for a rpt  on 2019, GDMA2 - metformin 500 mg po hs otherwise no other ap care comp at this time.        abdomen: soft, nt on palp  SSE: + pooling  small amt clear odorless amniotic fluid   + nitrazine  SVE: 0.5/50/-3  T(C): --  HR: 91 (19 @ 18:31) (91 - 91)  BP: 142/69 (19 @ 18:31) (142/69 - 142/69)  RR: 18 (19 @ 18:31) (18 - 18)  SpO2: --  FS- 84  GBS- unknown  cat 1 FHT  toco: irreg  last meal @ 1700  d/w dr haddad/ dr rudolph  d/w dr chiang  admit to l&d  PPROM @ 34.2 wks gest GDMA2 / Amp / Beta/ PEC labs / for rpt / BTL  see admission orders        NKA  med hx:   seasonal asthma - no hosp no intubations  surg hx:   c- section x's 2   gyn hx: cervical polyp  ob hx:   3/20/2002 primary  arrest of dilitation 8#  2006 rpt  @ 39 wks 6#   meds:   PNV  Metformin 500 mg po hs          Huddle @   Dr Ortega , anesthesia, Dr Nicolas , Radha HARMON  , IVON HARMON 45 y/o  @ 34.2 wks gest presents with c/o LOF since 0700 states leaking mod amt clear odorless fluid throughout the day . pt c/o tightening " on and off" denies any VB reports +Fm denies any n/v/d denies any fever or chills denies any headaches visual disturbances otr right upper epigastric pain . ap care comp by : AMA , pt sched for a rpt  on 2019, GDMA2 - metformin 500 mg po hs otherwise no other ap care comp at this time.        abdomen: soft, nt on palp  SSE: + pooling  small amt clear odorless amniotic fluid   + nitrazine  SVE: 0.5/50/-3  T(C): --  HR: 91 (19 @ 18:31) (91 - 91)  BP: 142/69 (19 @ 18:31) (142/69 - 142/69)  RR: 18 (19 @ 18:31) (18 - 18)  SpO2: --  FS- 84  GBS- unknown  cat 1 FHT  toco: irreg  last meal @ 1700  d/w dr haddad/ dr rudolph  d/w dr chiang  admit to l&d  PPROM @ 34.2 wks gest GDMA2 / Amp / Beta/ PEC labs / for rpt / BTL  see admission orders        NKA  med hx:   seasonal asthma - no hosp no intubations  surg hx:   c- section x's 2   gyn hx: cervical polyp  ob hx:   3/20/2002 primary  arrest of dilitation 8#  2006 rpt  @ 39 wks 6#   meds:   PNV  Metformin 500 mg po hs          Huddle @   Dr Ortega , anesthesia, Dr Nicolas , Radha HARMON  , IVON HARMON        plan of care d/w dr johnny turner will perfrom rpt / BTL

## 2019-06-20 LAB
ALBUMIN SERPL ELPH-MCNC: 3.4 G/DL — SIGNIFICANT CHANGE UP (ref 3.3–5)
ALP SERPL-CCNC: 184 U/L — HIGH (ref 40–120)
ALT FLD-CCNC: 15 U/L — SIGNIFICANT CHANGE UP (ref 4–33)
ANION GAP SERPL CALC-SCNC: 17 MMO/L — HIGH (ref 7–14)
APPEARANCE UR: CLEAR — SIGNIFICANT CHANGE UP
APTT BLD: 30.5 SEC — SIGNIFICANT CHANGE UP (ref 27.5–36.3)
AST SERPL-CCNC: 24 U/L — SIGNIFICANT CHANGE UP (ref 4–32)
BASOPHILS # BLD AUTO: 0.05 K/UL — SIGNIFICANT CHANGE UP (ref 0–0.2)
BASOPHILS NFR BLD AUTO: 0.4 % — SIGNIFICANT CHANGE UP (ref 0–2)
BILIRUB SERPL-MCNC: 0.2 MG/DL — SIGNIFICANT CHANGE UP (ref 0.2–1.2)
BILIRUB UR-MCNC: NEGATIVE — SIGNIFICANT CHANGE UP
BLOOD UR QL VISUAL: NEGATIVE — SIGNIFICANT CHANGE UP
BUN SERPL-MCNC: 10 MG/DL — SIGNIFICANT CHANGE UP (ref 7–23)
CALCIUM SERPL-MCNC: 10 MG/DL — SIGNIFICANT CHANGE UP (ref 8.4–10.5)
CHLORIDE SERPL-SCNC: 104 MMOL/L — SIGNIFICANT CHANGE UP (ref 98–107)
CO2 SERPL-SCNC: 18 MMOL/L — LOW (ref 22–31)
COLOR SPEC: COLORLESS — SIGNIFICANT CHANGE UP
CREAT ?TM UR-MCNC: 18 MG/DL — SIGNIFICANT CHANGE UP
CREAT SERPL-MCNC: 0.68 MG/DL — SIGNIFICANT CHANGE UP (ref 0.5–1.3)
EOSINOPHIL # BLD AUTO: 0.17 K/UL — SIGNIFICANT CHANGE UP (ref 0–0.5)
EOSINOPHIL NFR BLD AUTO: 1.2 % — SIGNIFICANT CHANGE UP (ref 0–6)
FIBRINOGEN PPP-MCNC: 782.3 MG/DL — HIGH (ref 350–510)
GLUCOSE SERPL-MCNC: 78 MG/DL — SIGNIFICANT CHANGE UP (ref 70–99)
GLUCOSE UR-MCNC: NEGATIVE — SIGNIFICANT CHANGE UP
HCT VFR BLD CALC: 28.9 % — LOW (ref 34.5–45)
HCT VFR BLD CALC: 35.9 % — SIGNIFICANT CHANGE UP (ref 34.5–45)
HGB BLD-MCNC: 10.8 G/DL — LOW (ref 11.5–15.5)
HGB BLD-MCNC: 8.9 G/DL — LOW (ref 11.5–15.5)
IMM GRANULOCYTES NFR BLD AUTO: 0.2 % — SIGNIFICANT CHANGE UP (ref 0–1.5)
INR BLD: 0.94 — SIGNIFICANT CHANGE UP (ref 0.88–1.17)
KETONES UR-MCNC: NEGATIVE — SIGNIFICANT CHANGE UP
LDH SERPL L TO P-CCNC: 287 U/L — HIGH (ref 135–225)
LEUKOCYTE ESTERASE UR-ACNC: NEGATIVE — SIGNIFICANT CHANGE UP
LYMPHOCYTES # BLD AUTO: 17.6 % — SIGNIFICANT CHANGE UP (ref 13–44)
LYMPHOCYTES # BLD AUTO: 2.45 K/UL — SIGNIFICANT CHANGE UP (ref 1–3.3)
MCHC RBC-ENTMCNC: 22.4 PG — LOW (ref 27–34)
MCHC RBC-ENTMCNC: 22.7 PG — LOW (ref 27–34)
MCHC RBC-ENTMCNC: 30.1 % — LOW (ref 32–36)
MCHC RBC-ENTMCNC: 30.8 % — LOW (ref 32–36)
MCV RBC AUTO: 72.6 FL — LOW (ref 80–100)
MCV RBC AUTO: 75.4 FL — LOW (ref 80–100)
MONOCYTES # BLD AUTO: 0.72 K/UL — SIGNIFICANT CHANGE UP (ref 0–0.9)
MONOCYTES NFR BLD AUTO: 5.2 % — SIGNIFICANT CHANGE UP (ref 2–14)
NEUTROPHILS # BLD AUTO: 10.52 K/UL — HIGH (ref 1.8–7.4)
NEUTROPHILS NFR BLD AUTO: 75.4 % — SIGNIFICANT CHANGE UP (ref 43–77)
NITRITE UR-MCNC: NEGATIVE — SIGNIFICANT CHANGE UP
NRBC # FLD: 0 K/UL — SIGNIFICANT CHANGE UP (ref 0–0)
NRBC # FLD: 0 K/UL — SIGNIFICANT CHANGE UP (ref 0–0)
PH UR: 7 — SIGNIFICANT CHANGE UP (ref 5–8)
PLATELET # BLD AUTO: 163 K/UL — SIGNIFICANT CHANGE UP (ref 150–400)
PLATELET # BLD AUTO: 189 K/UL — SIGNIFICANT CHANGE UP (ref 150–400)
PMV BLD: SIGNIFICANT CHANGE UP FL (ref 7–13)
PMV BLD: SIGNIFICANT CHANGE UP FL (ref 7–13)
POTASSIUM SERPL-MCNC: 4.2 MMOL/L — SIGNIFICANT CHANGE UP (ref 3.5–5.3)
POTASSIUM SERPL-SCNC: 4.2 MMOL/L — SIGNIFICANT CHANGE UP (ref 3.5–5.3)
PROT SERPL-MCNC: 7 G/DL — SIGNIFICANT CHANGE UP (ref 6–8.3)
PROT UR-MCNC: 4.3 MG/DL — SIGNIFICANT CHANGE UP
PROT UR-MCNC: NEGATIVE — SIGNIFICANT CHANGE UP
PROTHROM AB SERPL-ACNC: 10.4 SEC — SIGNIFICANT CHANGE UP (ref 9.8–13.1)
RBC # BLD: 3.98 M/UL — SIGNIFICANT CHANGE UP (ref 3.8–5.2)
RBC # BLD: 4.76 M/UL — SIGNIFICANT CHANGE UP (ref 3.8–5.2)
RBC # FLD: 14.5 % — SIGNIFICANT CHANGE UP (ref 10.3–14.5)
RBC # FLD: 15 % — HIGH (ref 10.3–14.5)
RBC CASTS # UR COMP ASSIST: SIGNIFICANT CHANGE UP (ref 0–?)
SODIUM SERPL-SCNC: 139 MMOL/L — SIGNIFICANT CHANGE UP (ref 135–145)
SP GR SPEC: 1 — SIGNIFICANT CHANGE UP (ref 1–1.04)
T PALLIDUM AB TITR SER: NEGATIVE — SIGNIFICANT CHANGE UP
URATE SERPL-MCNC: 6 MG/DL — SIGNIFICANT CHANGE UP (ref 2.5–7)
UROBILINOGEN FLD QL: NORMAL — SIGNIFICANT CHANGE UP
WBC # BLD: 13.94 K/UL — HIGH (ref 3.8–10.5)
WBC # BLD: 19.87 K/UL — HIGH (ref 3.8–10.5)
WBC # FLD AUTO: 13.94 K/UL — HIGH (ref 3.8–10.5)
WBC # FLD AUTO: 19.87 K/UL — HIGH (ref 3.8–10.5)
WBC UR QL: SIGNIFICANT CHANGE UP (ref 0–?)

## 2019-06-20 RX ORDER — SIMETHICONE 80 MG/1
80 TABLET, CHEWABLE ORAL EVERY 4 HOURS
Refills: 0 | Status: DISCONTINUED | OUTPATIENT
Start: 2019-06-20 | End: 2019-06-23

## 2019-06-20 RX ORDER — ONDANSETRON 8 MG/1
4 TABLET, FILM COATED ORAL EVERY 6 HOURS
Refills: 0 | Status: DISCONTINUED | OUTPATIENT
Start: 2019-06-20 | End: 2019-06-22

## 2019-06-20 RX ORDER — TETANUS TOXOID, REDUCED DIPHTHERIA TOXOID AND ACELLULAR PERTUSSIS VACCINE, ADSORBED 5; 2.5; 8; 8; 2.5 [IU]/.5ML; [IU]/.5ML; UG/.5ML; UG/.5ML; UG/.5ML
0.5 SUSPENSION INTRAMUSCULAR ONCE
Refills: 0 | Status: DISCONTINUED | OUTPATIENT
Start: 2019-06-20 | End: 2019-06-23

## 2019-06-20 RX ORDER — ACETAMINOPHEN 500 MG
975 TABLET ORAL
Refills: 0 | Status: DISCONTINUED | OUTPATIENT
Start: 2019-06-20 | End: 2019-06-23

## 2019-06-20 RX ORDER — LANOLIN
1 OINTMENT (GRAM) TOPICAL EVERY 6 HOURS
Refills: 0 | Status: DISCONTINUED | OUTPATIENT
Start: 2019-06-20 | End: 2019-06-23

## 2019-06-20 RX ORDER — HEPARIN SODIUM 5000 [USP'U]/ML
5000 INJECTION INTRAVENOUS; SUBCUTANEOUS EVERY 12 HOURS
Refills: 0 | Status: DISCONTINUED | OUTPATIENT
Start: 2019-06-20 | End: 2019-06-23

## 2019-06-20 RX ORDER — IBUPROFEN 200 MG
600 TABLET ORAL EVERY 6 HOURS
Refills: 0 | Status: COMPLETED | OUTPATIENT
Start: 2019-06-20 | End: 2020-05-18

## 2019-06-20 RX ORDER — HYDROMORPHONE HYDROCHLORIDE 2 MG/ML
0.5 INJECTION INTRAMUSCULAR; INTRAVENOUS; SUBCUTANEOUS
Refills: 0 | Status: DISCONTINUED | OUTPATIENT
Start: 2019-06-20 | End: 2019-06-20

## 2019-06-20 RX ORDER — SODIUM CHLORIDE 9 MG/ML
500 INJECTION, SOLUTION INTRAVENOUS ONCE
Refills: 0 | Status: COMPLETED | OUTPATIENT
Start: 2019-06-20 | End: 2019-06-20

## 2019-06-20 RX ORDER — MAGNESIUM HYDROXIDE 400 MG/1
30 TABLET, CHEWABLE ORAL
Refills: 0 | Status: DISCONTINUED | OUTPATIENT
Start: 2019-06-20 | End: 2019-06-23

## 2019-06-20 RX ORDER — OXYTOCIN 10 UNIT/ML
333.33 VIAL (ML) INJECTION
Qty: 20 | Refills: 0 | Status: DISCONTINUED | OUTPATIENT
Start: 2019-06-20 | End: 2019-06-20

## 2019-06-20 RX ORDER — OXYCODONE HYDROCHLORIDE 5 MG/1
5 TABLET ORAL
Refills: 0 | Status: DISCONTINUED | OUTPATIENT
Start: 2019-06-20 | End: 2019-06-23

## 2019-06-20 RX ORDER — KETOROLAC TROMETHAMINE 30 MG/ML
30 SYRINGE (ML) INJECTION EVERY 6 HOURS
Refills: 0 | Status: DISCONTINUED | OUTPATIENT
Start: 2019-06-20 | End: 2019-06-21

## 2019-06-20 RX ORDER — OXYCODONE HYDROCHLORIDE 5 MG/1
5 TABLET ORAL ONCE
Refills: 0 | Status: DISCONTINUED | OUTPATIENT
Start: 2019-06-20 | End: 2019-06-23

## 2019-06-20 RX ORDER — DIPHENHYDRAMINE HCL 50 MG
25 CAPSULE ORAL EVERY 6 HOURS
Refills: 0 | Status: DISCONTINUED | OUTPATIENT
Start: 2019-06-20 | End: 2019-06-23

## 2019-06-20 RX ORDER — SODIUM CHLORIDE 9 MG/ML
1000 INJECTION, SOLUTION INTRAVENOUS
Refills: 0 | Status: DISCONTINUED | OUTPATIENT
Start: 2019-06-20 | End: 2019-06-20

## 2019-06-20 RX ORDER — DOCUSATE SODIUM 100 MG
100 CAPSULE ORAL
Refills: 0 | Status: DISCONTINUED | OUTPATIENT
Start: 2019-06-20 | End: 2019-06-23

## 2019-06-20 RX ORDER — GLYCERIN ADULT
1 SUPPOSITORY, RECTAL RECTAL AT BEDTIME
Refills: 0 | Status: DISCONTINUED | OUTPATIENT
Start: 2019-06-20 | End: 2019-06-23

## 2019-06-20 RX ADMIN — SODIUM CHLORIDE 75 MILLILITER(S): 9 INJECTION, SOLUTION INTRAVENOUS at 03:50

## 2019-06-20 RX ADMIN — Medication 30 MILLIGRAM(S): at 09:35

## 2019-06-20 RX ADMIN — Medication 30 MILLIGRAM(S): at 21:33

## 2019-06-20 RX ADMIN — SODIUM CHLORIDE 125 MILLILITER(S): 9 INJECTION, SOLUTION INTRAVENOUS at 10:20

## 2019-06-20 RX ADMIN — HEPARIN SODIUM 5000 UNIT(S): 5000 INJECTION INTRAVENOUS; SUBCUTANEOUS at 21:34

## 2019-06-20 RX ADMIN — HEPARIN SODIUM 5000 UNIT(S): 5000 INJECTION INTRAVENOUS; SUBCUTANEOUS at 09:12

## 2019-06-20 RX ADMIN — SODIUM CHLORIDE 2000 MILLILITER(S): 9 INJECTION, SOLUTION INTRAVENOUS at 01:00

## 2019-06-20 RX ADMIN — Medication 1000 MILLIUNIT(S)/MIN: at 03:55

## 2019-06-20 RX ADMIN — Medication 30 MILLIGRAM(S): at 16:00

## 2019-06-20 RX ADMIN — Medication 30 MILLIGRAM(S): at 15:29

## 2019-06-20 RX ADMIN — Medication 10 MILLIGRAM(S): at 00:58

## 2019-06-20 RX ADMIN — SODIUM CHLORIDE 1000 MILLILITER(S): 9 INJECTION, SOLUTION INTRAVENOUS at 06:47

## 2019-06-20 RX ADMIN — Medication 30 MILLIGRAM(S): at 22:30

## 2019-06-20 RX ADMIN — Medication 30 MILLILITER(S): at 00:57

## 2019-06-20 RX ADMIN — FAMOTIDINE 20 MILLIGRAM(S): 10 INJECTION INTRAVENOUS at 00:58

## 2019-06-20 RX ADMIN — SODIUM CHLORIDE 1000 MILLILITER(S): 9 INJECTION, SOLUTION INTRAVENOUS at 10:15

## 2019-06-20 RX ADMIN — SODIUM CHLORIDE 1000 MILLILITER(S): 9 INJECTION, SOLUTION INTRAVENOUS at 05:20

## 2019-06-20 RX ADMIN — Medication 30 MILLIGRAM(S): at 09:11

## 2019-06-20 NOTE — CONSULT NOTE ADULT - ASSESSMENT
Echo  - Normal LV function ( out patient )       a/p     1) Palpitations - now less in frequency heart rate normal, no presyncope or syncope, 2d echo 4 months ago shows normal LV function, needs op tele     2) S/P  - t/t per obgyn

## 2019-06-20 NOTE — CHART NOTE - NSCHARTNOTEFT_GEN_A_CORE
Pt is POD0 after rLTCS  evaluated for oliguria. Patient's output has been 30-30-15-20. Patient got 1500 of fluid in the OR. Has gotten 2L in bolus. Patient feels asymptomatic. Barry was flushed. The patient moved around. Patient is tolerating PO liquids, but hasn't drank much. Denies HA, dizziness, lightheadedness, SOB, palpitations, CP, abdominal/pelvic pain, heavy vaginal bleeding. Vitals stable.     Vital Signs Last 24 Hrs  T(C): 36.6 (2019 08:00), Max: 36.9 (2019 01:03)  T(F): 97.9 (2019 08:00), Max: 98.42 (2019 01:03)  HR: 67 (2019 11:00) (64 - 103)  BP: 110/57 (2019 11:00) (102/53 - 158/132)  BP(mean): 70 (:00) (65 - 137)  RR: 15 (2019 11:00) (11 - 20)  SpO2: 100% (2019 11:00) (98% - 100%)    I&O's Detail    2019 07:01  -  2019 07:00  --------------------------------------------------------  IN:    dextrose 5% + sodium chloride 0.9%: 312.5 mL    Lactated Ringers IV Bolus: 1000 mL    lactated ringers.: 375 mL    lactated ringers.: 225 mL    Other: 1500 mL    sodium chloride 0.9%: 250 mL  Total IN: 3662.5 mL    OUT:    Estimated Blood Loss: 500 mL    Indwelling Catheter - Urethral: 425 mL  Total OUT: 925 mL    Total NET: 2737.5 mL      2019 07:01  -  2019 11:29  --------------------------------------------------------  IN:    Lactated Ringers IV Bolus: 500 mL    lactated ringers.: 125 mL    lactated ringers.: 125 mL    lactated ringers.: 150 mL    oxytocin Infusion: 250 mL  Total IN: 1150 mL    OUT:    Indwelling Catheter - Urethral: 95 mL  Total OUT: 95 mL    Total NET: 1055 mL                                10.8   13.94 )-----------( 189      ( 2019 21:45 )             35.9       -    139  |  104  |  10  ----------------------------<  78  4.2   |  18<L>  |  0.68    Ca    10.0      2019 20:26    TPro  7.0  /  Alb  3.4  /  TBili  0.2  /  DBili  x   /  AST  24  /  ALT  15  /  AlkPhos  184<H>        CAPILLARY BLOOD GLUCOSE      POCT Blood Glucose.: 119 mg/dL (2019 00:26)  POCT Blood Glucose.: 128 mg/dL (2019 23:53)      LIVER FUNCTIONS - ( 2019 20:26 )  Alb: 3.4 g/dL / Pro: 7.0 g/dL / ALK PHOS: 184 u/L / ALT: 15 u/L / AST: 24 u/L / GGT: x             PT/INR - ( 2019 21:45 )   PT: 10.4 SEC;   INR: 0.94          PTT - ( 2019 21:45 )  PTT:30.5 SEC    Urinalysis Basic - ( 2019 21:38 )    Color: COLORLESS / Appearance: CLEAR / S.005 / pH: 7.0  Gluc: NEGATIVE / Ketone: NEGATIVE  / Bili: NEGATIVE / Urobili: NORMAL   Blood: NEGATIVE / Protein: NEGATIVE / Nitrite: NEGATIVE   Leuk Esterase: NEGATIVE / RBC: 0-2 / WBC 0-2   Sq Epi: x / Non Sq Epi: x / Bacteria: x    Physical:  Abd: Appropriately tender/distended, no guarding/rebound tenderness, Uterine fundus firm  VE: No heavy vaginal bleeding noted  Barry: In place    FAST Scan: Negative for free fluid    A/P: Patient is POD0 after rLTCS  with oliguria 30-30-15-20. FAST negative. Total in was 3500.   -Monitor I/O  -Monitor VS  -FAST Neg  -Encourage oral hydration  -Increase maintenance fluids to 200mL/Hr  -Reassess if urine output does not increase.    Francisco Javier Villalobos PGY2

## 2019-06-20 NOTE — OB PROVIDER DELIVERY SUMMARY - NSPROVIDERDELIVERYNOTE_OBGYN_ALL_OB_FT
Viable male infant, apgars 8,9, weight=1985g, cephalic presentation.  Grossly normal uterus, tubes, ovaries, cord gases sent.  EBL=500ml  OWB=3037ix  GIZ=991jz Viable male infant, apgars 8,9, weight=1985g, cephalic presentation.  Grossly normal uterus, tubes, ovaries, cord gases sent.  EBL=500ml  JCG=8833xw  EVJ=740qu  Performed bilateral tubal ligation

## 2019-06-20 NOTE — CONSULT NOTE ADULT - SUBJECTIVE AND OBJECTIVE BOX
Dandre Escalante MD  Interventional Cardiology   [place] Office : [address] [place] [address] [place]. [address]  Tel:   [place] Office : [address] [facility] [place] [address]  Tel: [phone]  Cell : [phone]    HISTORY OF PRESENT ILLNESS:    Pt is a 43 yo female s/p . Pt well known to us as op, workup including 2D echo was normal , pt currently complains of some palpitations, no presyncope , no dizziness, no SOB no chest pains     PAST MEDICAL & SURGICAL HISTORY:  Alpha thalassemia  Gestational diabetes: Metformin 500mg po qpm    	    MEDICATIONS:  heparin  Injectable 5000 Unit(s) SubCutaneous every 12 hours        acetaminophen   Tablet .. 975 milliGRAM(s) Oral <User Schedule>  diphenhydrAMINE 25 milliGRAM(s) Oral every 6 hours PRN  ibuprofen  Tablet. 600 milliGRAM(s) Oral every 6 hours  ketorolac   Injectable 30 milliGRAM(s) IV Push every 6 hours  ondansetron Injectable 4 milliGRAM(s) IV Push every 6 hours PRN  oxyCODONE    IR 5 milliGRAM(s) Oral every 3 hours PRN  oxyCODONE    IR 5 milliGRAM(s) Oral once PRN    docusate sodium 100 milliGRAM(s) Oral two times a day PRN  glycerin Suppository - Adult 1 Suppository(s) Rectal at bedtime PRN  magnesium hydroxide Suspension 30 milliLiter(s) Oral two times a day PRN  simethicone 80 milliGRAM(s) Chew every 4 hours PRN      diphtheria/tetanus/pertussis (acellular) Vaccine (ADAcel) 0.5 milliLiter(s) IntraMuscular once  lanolin Ointment 1 Application(s) Topical every 6 hours PRN      FAMILY HISTORY:        Allergies    No Known Drug Allergies  Seasonal (Sneezing)    Intolerances    	      PHYSICAL EXAM:  T(C): 36.7 (19 @ 21:35), Max: 37.1 (19 @ 14:57)  HR: 80 (19 @ 21:35) (64 - 103)  BP: 111/56 (19 @ 21:35) (101/57 - 158/132)  RR: 18 (19 @ 21:35) (11 - 20)  SpO2: 99% (19 @ 21:35) (98% - 100%)  Wt(kg): --  I&O's Summary    2019 07:  -  2019 07:00  --------------------------------------------------------  IN: 3662.5 mL / OUT: 925 mL / NET: 2737.5 mL    2019 07:  -  2019 23:40  --------------------------------------------------------  IN: 1550 mL / OUT: 1470 mL / NET: 80 mL        Appearance: Normal	  HEENT:   Normal oral mucosa, PERRL, EOMI	  Cardiovascular: Normal S1 S2, No JVD, No murmurs, No edema  Respiratory: Lungs clear to auscultation	  Gastrointestinal:  Soft, mild-tenderness, + BS	  Extremities: No clubbing, cyanosis or edema    LABS:	 	    CARDIAC MARKERS:                                  8.9    19.87 )-----------( 163      ( 2019 17:25 )             28.9         139  |  104  |  10  ----------------------------<  78  4.2   |  18<L>  |  0.68    Ca    10.0      2019 20:26    TPro  7.0  /  Alb  3.4  /  TBili  0.2  /  DBili  x   /  AST  24  /  ALT  15  /  AlkPhos  184<H>      proBNP:   Lipid Profile:   HgA1c:   TSH:     ASSESSMENT/PLAN:

## 2019-06-20 NOTE — OB NEONATOLOGY/PEDIATRICIAN DELIVERY SUMMARY - NSPEDSNEONOTESA_OBGYN_ALL_OB_FT
Baby is a 34.3 week GA M born to a 43 y/o G 3 P 2 mother via unscheduled c/s for  labor. Maternal history notable for alpha thalassemia, asthma, GDMA on metformin. Pregnancy notable for GDMA,  labor s/p beta x1 20:34. Maternal blood type O+ Prenatal labs neg/NR/imm. GBS unknown s/p amp x1.  ROM 19 hrs with clear fluid. Baby born vigorous and crying spontaneously. Warmed, dried, stimulated. Apgars 8 / 9. at 7 min of life, patient oxygen saturation of 70%. Placed on CPAP 6 max 40%, 9 min of life with grunting, nasal flaring and tachypnea. Able to be weaned to CPAP 6 30% and transferred to NICU for further care. Breast / bottle feed and wants hep b and circ     Gen: NAD; well-appearing  HEENT: NC/AT; AFOF; ears and nose clinically patent, normally set; no tags; oropharynx clear  Skin: acrocyanosis, warm,   Resp: bilateral breath sounds, even, non-labored breathing  Cardiac: RRR, normal S1 and S2; no murmurs; 2+ femoral pulses b/l  Abd: soft, NT/ND; +BS; no HSM; umbilicus 3 vessels  Extremities: FROM; no crepitus; Hips: negative O/B  : Nahid I; no abnormalities; no hernia; anus patent  Spine: no sacral dimple or hair tuft  Neuro: +sharon, suck, grasp, Babinski; good tone throughout

## 2019-06-21 ENCOUNTER — TRANSCRIPTION ENCOUNTER (OUTPATIENT)
Age: 44
End: 2019-06-21

## 2019-06-21 DIAGNOSIS — Z01.818 ENCOUNTER FOR OTHER PREPROCEDURAL EXAMINATION: ICD-10-CM

## 2019-06-21 LAB
BASOPHILS # BLD AUTO: 0.03 K/UL — SIGNIFICANT CHANGE UP (ref 0–0.2)
BASOPHILS NFR BLD AUTO: 0.2 % — SIGNIFICANT CHANGE UP (ref 0–2)
EOSINOPHIL # BLD AUTO: 0.03 K/UL — SIGNIFICANT CHANGE UP (ref 0–0.5)
EOSINOPHIL NFR BLD AUTO: 0.2 % — SIGNIFICANT CHANGE UP (ref 0–6)
HCT VFR BLD CALC: 28.2 % — LOW (ref 34.5–45)
HGB BLD-MCNC: 8.7 G/DL — LOW (ref 11.5–15.5)
IMM GRANULOCYTES NFR BLD AUTO: 0.6 % — SIGNIFICANT CHANGE UP (ref 0–1.5)
LYMPHOCYTES # BLD AUTO: 14.9 % — SIGNIFICANT CHANGE UP (ref 13–44)
LYMPHOCYTES # BLD AUTO: 2.3 K/UL — SIGNIFICANT CHANGE UP (ref 1–3.3)
MCHC RBC-ENTMCNC: 22.6 PG — LOW (ref 27–34)
MCHC RBC-ENTMCNC: 30.9 % — LOW (ref 32–36)
MCV RBC AUTO: 73.2 FL — LOW (ref 80–100)
MONOCYTES # BLD AUTO: 0.91 K/UL — HIGH (ref 0–0.9)
MONOCYTES NFR BLD AUTO: 5.9 % — SIGNIFICANT CHANGE UP (ref 2–14)
NEUTROPHILS # BLD AUTO: 12.08 K/UL — HIGH (ref 1.8–7.4)
NEUTROPHILS NFR BLD AUTO: 78.2 % — HIGH (ref 43–77)
NRBC # FLD: 0 K/UL — SIGNIFICANT CHANGE UP (ref 0–0)
PLATELET # BLD AUTO: 148 K/UL — LOW (ref 150–400)
PMV BLD: SIGNIFICANT CHANGE UP FL (ref 7–13)
RBC # BLD: 3.85 M/UL — SIGNIFICANT CHANGE UP (ref 3.8–5.2)
RBC # FLD: 14.4 % — SIGNIFICANT CHANGE UP (ref 10.3–14.5)
WBC # BLD: 15.44 K/UL — HIGH (ref 3.8–10.5)
WBC # FLD AUTO: 15.44 K/UL — HIGH (ref 3.8–10.5)

## 2019-06-21 RX ORDER — OXYCODONE HYDROCHLORIDE 5 MG/1
5 TABLET ORAL
Refills: 0 | Status: DISCONTINUED | OUTPATIENT
Start: 2019-06-21 | End: 2019-06-22

## 2019-06-21 RX ORDER — ONDANSETRON 8 MG/1
4 TABLET, FILM COATED ORAL ONCE
Refills: 0 | Status: DISCONTINUED | OUTPATIENT
Start: 2019-06-21 | End: 2019-06-22

## 2019-06-21 RX ORDER — HYDROMORPHONE HYDROCHLORIDE 2 MG/ML
1 INJECTION INTRAMUSCULAR; INTRAVENOUS; SUBCUTANEOUS
Refills: 0 | Status: DISCONTINUED | OUTPATIENT
Start: 2019-06-21 | End: 2019-06-22

## 2019-06-21 RX ORDER — GLYCERIN ADULT
1 SUPPOSITORY, RECTAL RECTAL AT BEDTIME
Refills: 0 | Status: DISCONTINUED | OUTPATIENT
Start: 2019-06-21 | End: 2019-06-22

## 2019-06-21 RX ORDER — HEPARIN SODIUM 5000 [USP'U]/ML
5000 INJECTION INTRAVENOUS; SUBCUTANEOUS EVERY 12 HOURS
Refills: 0 | Status: DISCONTINUED | OUTPATIENT
Start: 2019-06-21 | End: 2019-06-22

## 2019-06-21 RX ORDER — KETOROLAC TROMETHAMINE 30 MG/ML
30 SYRINGE (ML) INJECTION EVERY 6 HOURS
Refills: 0 | Status: DISCONTINUED | OUTPATIENT
Start: 2019-06-21 | End: 2019-06-22

## 2019-06-21 RX ORDER — ACETAMINOPHEN 500 MG
975 TABLET ORAL
Refills: 0 | Status: DISCONTINUED | OUTPATIENT
Start: 2019-06-21 | End: 2019-06-22

## 2019-06-21 RX ORDER — MORPHINE SULFATE 50 MG/1
0.1 CAPSULE, EXTENDED RELEASE ORAL ONCE
Refills: 0 | Status: DISCONTINUED | OUTPATIENT
Start: 2019-06-21 | End: 2019-06-22

## 2019-06-21 RX ORDER — DOCUSATE SODIUM 100 MG
100 CAPSULE ORAL
Refills: 0 | Status: DISCONTINUED | OUTPATIENT
Start: 2019-06-21 | End: 2019-06-22

## 2019-06-21 RX ORDER — SIMETHICONE 80 MG/1
80 TABLET, CHEWABLE ORAL EVERY 4 HOURS
Refills: 0 | Status: DISCONTINUED | OUTPATIENT
Start: 2019-06-21 | End: 2019-06-22

## 2019-06-21 RX ORDER — MAGNESIUM HYDROXIDE 400 MG/1
30 TABLET, CHEWABLE ORAL
Refills: 0 | Status: DISCONTINUED | OUTPATIENT
Start: 2019-06-21 | End: 2019-06-22

## 2019-06-21 RX ORDER — METFORMIN HYDROCHLORIDE 850 MG/1
0 TABLET ORAL
Qty: 0 | Refills: 0 | DISCHARGE

## 2019-06-21 RX ORDER — NALOXONE HYDROCHLORIDE 4 MG/.1ML
0.1 SPRAY NASAL
Refills: 0 | Status: DISCONTINUED | OUTPATIENT
Start: 2019-06-21 | End: 2019-06-22

## 2019-06-21 RX ORDER — IBUPROFEN 200 MG
600 TABLET ORAL EVERY 6 HOURS
Refills: 0 | Status: DISCONTINUED | OUTPATIENT
Start: 2019-06-21 | End: 2019-06-23

## 2019-06-21 RX ORDER — LANOLIN
1 OINTMENT (GRAM) TOPICAL EVERY 6 HOURS
Refills: 0 | Status: DISCONTINUED | OUTPATIENT
Start: 2019-06-21 | End: 2019-06-22

## 2019-06-21 RX ORDER — OXYCODONE HYDROCHLORIDE 5 MG/1
10 TABLET ORAL
Refills: 0 | Status: DISCONTINUED | OUTPATIENT
Start: 2019-06-21 | End: 2019-06-22

## 2019-06-21 RX ORDER — DIPHENHYDRAMINE HCL 50 MG
25 CAPSULE ORAL EVERY 6 HOURS
Refills: 0 | Status: DISCONTINUED | OUTPATIENT
Start: 2019-06-21 | End: 2019-06-22

## 2019-06-21 RX ORDER — OXYTOCIN 10 UNIT/ML
333.33 VIAL (ML) INJECTION
Qty: 20 | Refills: 0 | Status: DISCONTINUED | OUTPATIENT
Start: 2019-06-21 | End: 2019-06-22

## 2019-06-21 RX ADMIN — Medication 600 MILLIGRAM(S): at 06:35

## 2019-06-21 RX ADMIN — Medication 600 MILLIGRAM(S): at 23:33

## 2019-06-21 RX ADMIN — Medication 975 MILLIGRAM(S): at 21:04

## 2019-06-21 RX ADMIN — Medication 600 MILLIGRAM(S): at 18:10

## 2019-06-21 RX ADMIN — Medication 975 MILLIGRAM(S): at 21:45

## 2019-06-21 RX ADMIN — HEPARIN SODIUM 5000 UNIT(S): 5000 INJECTION INTRAVENOUS; SUBCUTANEOUS at 11:52

## 2019-06-21 RX ADMIN — Medication 975 MILLIGRAM(S): at 05:15

## 2019-06-21 RX ADMIN — Medication 600 MILLIGRAM(S): at 12:35

## 2019-06-21 RX ADMIN — Medication 600 MILLIGRAM(S): at 19:15

## 2019-06-21 RX ADMIN — SIMETHICONE 80 MILLIGRAM(S): 80 TABLET, CHEWABLE ORAL at 21:04

## 2019-06-21 RX ADMIN — HEPARIN SODIUM 5000 UNIT(S): 5000 INJECTION INTRAVENOUS; SUBCUTANEOUS at 23:32

## 2019-06-21 RX ADMIN — Medication 600 MILLIGRAM(S): at 06:51

## 2019-06-21 RX ADMIN — Medication 975 MILLIGRAM(S): at 04:15

## 2019-06-21 RX ADMIN — Medication 100 MILLIGRAM(S): at 11:53

## 2019-06-21 RX ADMIN — Medication 600 MILLIGRAM(S): at 11:53

## 2019-06-21 RX ADMIN — Medication 100 MILLIGRAM(S): at 21:04

## 2019-06-21 RX ADMIN — SIMETHICONE 80 MILLIGRAM(S): 80 TABLET, CHEWABLE ORAL at 11:53

## 2019-06-21 NOTE — PROGRESS NOTE ADULT - SUBJECTIVE AND OBJECTIVE BOX
Pain Service Follow-up  Postop Day  1    S/P  C- Section    T(C): 36.8 (06-21-19 @ 06:07), Max: 37.1 (06-20-19 @ 14:57)  HR: 81 (06-21-19 @ 06:07) (64 - 81)  BP: 120/72 (06-21-19 @ 06:07) (101/57 - 121/64)  RR: 16 (06-21-19 @ 06:07) (13 - 18)  SpO2: 100% (06-21-19 @ 06:07) (98% - 100%)  Wt(kg): --      THERAPY:  Spinal Morphine     Sedation Score:	  [X] Alert	      [  ] Drowsy       [  ] Arousable	[  ] Asleep         [  ] Unresponsive    Side Effects:	  [X] None	      [  ] Nausea       [  ] Pruritus        [  ] Weakness   [  ] Numbness        ASSESSMENT/ PLAN   [ X ] Discontinue         [  ] Continue    [ X ] Documentation and Verification of current medications       Satisfactory Post Anesthetic Course

## 2019-06-21 NOTE — PROGRESS NOTE ADULT - SUBJECTIVE AND OBJECTIVE BOX
patient seen and examined at bedside.  OOB ambulating.  pos voids, pos flatus, pos bowel movement.    GEN: NAD  Abd: soft, nondistended, appropriate tenderness  INCISION: clean/dry/intact with steristrips  Uterus: firm below umbilicus  vag: minimal blood noted on pad  LE: no calf tenderness noted bilaterally

## 2019-06-21 NOTE — DISCHARGE NOTE OB - CARE PROVIDER_API CALL
Yolanda Guerrero (DO)  Obstetrics  Gynecology Obstetrics and Gynecology  [address] [place] [address]  Phone: [phone]  Fax: [phone]  Follow Up Time:

## 2019-06-21 NOTE — PROGRESS NOTE ADULT - PROBLEM SELECTOR PLAN 1
- monitor vitals  - reg diet  - OOB ambulation  - pain medications orally prn for pain  - dc planning

## 2019-06-21 NOTE — DISCHARGE NOTE OB - MEDICATION SUMMARY - MEDICATIONS TO STOP TAKING
I will STOP taking the medications listed below when I get home from the hospital:    metFORMIN 500 mg oral tablet  -- orally once a day

## 2019-06-21 NOTE — PROGRESS NOTE ADULT - SUBJECTIVE AND OBJECTIVE BOX
Dandre Escalante MD  Interventional Cardiology / Advance Heart Failure and Cardiac Transplant Specialist  [place] Office : [address] [place] [address]  Tel:   [place] Office : [address] [facility] [place] [address]  Tel: [phone]  Cell : [phone]    Subjective : Pt lying in bed comfortable, not in distress, denies any chest pain or SOB  	  MEDICATIONS:  heparin  Injectable 5000 Unit(s) SubCutaneous every 12 hours        acetaminophen   Tablet .. 975 milliGRAM(s) Oral <User Schedule>  diphenhydrAMINE 25 milliGRAM(s) Oral every 6 hours PRN  ibuprofen  Tablet. 600 milliGRAM(s) Oral every 6 hours  oxyCODONE    IR 5 milliGRAM(s) Oral every 3 hours PRN  oxyCODONE    IR 5 milliGRAM(s) Oral once PRN    docusate sodium 100 milliGRAM(s) Oral two times a day PRN  glycerin Suppository - Adult 1 Suppository(s) Rectal at bedtime PRN  magnesium hydroxide Suspension 30 milliLiter(s) Oral two times a day PRN  simethicone 80 milliGRAM(s) Chew every 4 hours PRN      diphtheria/tetanus/pertussis (acellular) Vaccine (ADAcel) 0.5 milliLiter(s) IntraMuscular once  lanolin Ointment 1 Application(s) Topical every 6 hours PRN      PHYSICAL EXAM:  T(C): 36.9 (06-22-19 @ 13:46), Max: 37.2 (06-21-19 @ 21:33)  HR: 86 (06-22-19 @ 13:46) (70 - 86)  BP: 120/65 (06-22-19 @ 13:46) (110/62 - 128/68)  RR: 18 (06-22-19 @ 13:46) (18 - 19)  SpO2: 100% (06-22-19 @ 13:46) (98% - 100%)  Wt(kg): --  I&O's Summary          HEENT:   Normal oral mucosa, PERRL, EOMI	  Cardiovascular: Normal S1 S2, No JVD, No murmurs, No edema  Respiratory: Lungs clear to auscultation	  Gastrointestinal:  s/p surgery  Extremities: Normal range of motion, No clubbing, cyanosis or edema                                    8.7    15.44 )-----------( 148      ( 21 Jun 2019 05:30 )             28.2           proBNP:   Lipid Profile:   HgA1c:   TSH:

## 2019-06-21 NOTE — DISCHARGE NOTE OB - PATIENT PORTAL LINK FT
You can access the [facility] Patient Portal, offered by [facility], by registering with the following website: [email or web address]

## 2019-06-22 RX ADMIN — Medication 600 MILLIGRAM(S): at 06:10

## 2019-06-22 RX ADMIN — Medication 600 MILLIGRAM(S): at 05:31

## 2019-06-22 RX ADMIN — Medication 100 MILLIGRAM(S): at 05:31

## 2019-06-22 RX ADMIN — SIMETHICONE 80 MILLIGRAM(S): 80 TABLET, CHEWABLE ORAL at 05:30

## 2019-06-22 RX ADMIN — Medication 600 MILLIGRAM(S): at 20:59

## 2019-06-22 RX ADMIN — Medication 600 MILLIGRAM(S): at 14:00

## 2019-06-22 RX ADMIN — HEPARIN SODIUM 5000 UNIT(S): 5000 INJECTION INTRAVENOUS; SUBCUTANEOUS at 13:39

## 2019-06-22 RX ADMIN — Medication 975 MILLIGRAM(S): at 04:30

## 2019-06-22 RX ADMIN — HEPARIN SODIUM 5000 UNIT(S): 5000 INJECTION INTRAVENOUS; SUBCUTANEOUS at 23:02

## 2019-06-22 RX ADMIN — Medication 600 MILLIGRAM(S): at 00:10

## 2019-06-22 RX ADMIN — Medication 975 MILLIGRAM(S): at 03:49

## 2019-06-22 RX ADMIN — Medication 600 MILLIGRAM(S): at 13:39

## 2019-06-22 RX ADMIN — MAGNESIUM HYDROXIDE 30 MILLILITER(S): 400 TABLET, CHEWABLE ORAL at 13:46

## 2019-06-22 NOTE — PROGRESS NOTE ADULT - SUBJECTIVE AND OBJECTIVE BOX
SUBJECTIVE:    Pain: well controlled  Complaints: none    MILESTONES:    Alert and oriented x 3  [ x ]  Out of bed/ ambulating. [ x ]  Flatus: [x  ]  Postive [  ] Negative   Bowel movement  [  ] Positive [ x ] Negative   Voiding [x  ] Due to void [  ]  Diet: Regular [ x ]  Clears [  ]  NPO [  ]  Infant feeding:  Breast [x  ]   Bottle [  ]  Both [  ]  Feeding related inssues and/or concerns:none      OBJECTIVE:  T(C): 37 (19 @ 06:16), Max: 37.2 (19 @ 21:33)  HR: 70 (19 @ 06:16) (70 - 84)  BP: 110/62 (19 @ 06:16) (110/62 - 128/68)  RR: 18 (19 @ 06:16) (18 - 19)  SpO2: 98% (19 @ 06:16) (98% - 100%)  Wt(kg): --                        8.7    15.44 )-----------( 148      ( 2019 05:30 )             28.2     MEDICATIONS  (STANDING):  acetaminophen   Tablet .. 975 milliGRAM(s) Oral <User Schedule>  diphtheria/tetanus/pertussis (acellular) Vaccine (ADAcel) 0.5 milliLiter(s) IntraMuscular once  heparin  Injectable 5000 Unit(s) SubCutaneous every 12 hours  ibuprofen  Tablet. 600 milliGRAM(s) Oral every 6 hours    MEDICATIONS  (PRN):  diphenhydrAMINE 25 milliGRAM(s) Oral every 6 hours PRN Itching  docusate sodium 100 milliGRAM(s) Oral two times a day PRN Stool softening  glycerin Suppository - Adult 1 Suppository(s) Rectal at bedtime PRN Constipation  lanolin Ointment 1 Application(s) Topical every 6 hours PRN Sore Nipples  magnesium hydroxide Suspension 30 milliLiter(s) Oral two times a day PRN Constipation  oxyCODONE    IR 5 milliGRAM(s) Oral every 3 hours PRN Moderate to Severe Pain (4-10)  oxyCODONE    IR 5 milliGRAM(s) Oral once PRN Moderate to Severe Pain (4-10)  simethicone 80 milliGRAM(s) Chew every 4 hours PRN Gas    ASSESSMENT:  44y  y/o G  3 P 3   PO Day#  2      Delivery: Primary [  ]    Repeat [x  ]                                       Indication of procedure: previous c/s  Condition: Stable  Past Medical History significant for: HPI:    Current Issues:none  Heart:           RRR                   Lungs: clear  Breasts:  Soft [x  ]   Engorged [  ]  Abdomen: Soft [x  ] , distended [  ] nontender [  ]   Bowel sounds :  Present [ x ]  Absent [  ]   Fundus firm [x  ]  Boggy [  ]  Abdominal incision: Clean, dry and intact [ x ]  Staples [  ] Steri Strips [ x ] Dermabond [  ] Sutures [  ]  Patient wearing abdominal binder for support.  Vaginal: Lochia:  Heavy [  ]  Moderate [  ]   Scant [x  ]  Extremities: Edema [ 0 ] negative Opal's Sign [x  ] Nontender Frank  [x  ] Positive pedal pulses [ x ]  Other relevant physical exam findings:      PLAN:  Plan: Increase ambulation, analgesia PRN and pain medication protocol standing oxycodone, ibuprofen and acetaminophen.  Diet: Regular diet  Continue routine post-operative and postpartum care.

## 2019-06-23 VITALS
SYSTOLIC BLOOD PRESSURE: 121 MMHG | OXYGEN SATURATION: 99 % | TEMPERATURE: 98 F | DIASTOLIC BLOOD PRESSURE: 67 MMHG | RESPIRATION RATE: 17 BRPM | HEART RATE: 74 BPM

## 2019-06-23 RX ORDER — OXYCODONE HYDROCHLORIDE 5 MG/1
5 TABLET ORAL ONCE
Refills: 0 | Status: DISCONTINUED | OUTPATIENT
Start: 2019-06-23 | End: 2019-06-23

## 2019-06-23 RX ADMIN — Medication 975 MILLIGRAM(S): at 03:24

## 2019-06-23 RX ADMIN — Medication 600 MILLIGRAM(S): at 09:59

## 2019-06-23 RX ADMIN — Medication 975 MILLIGRAM(S): at 02:49

## 2019-06-23 NOTE — PROGRESS NOTE ADULT - SUBJECTIVE AND OBJECTIVE BOX
OB Progress Note: LTCS, POD#2    S: 43yo  POD#2 s/p LTCS. Pain is well controlled. She is tolerating a regular diet and passing flatus. She is voiding spontaneously, and ambulating without difficulty. Denies CP/SOB. Denies lightheadedness/dizziness. Denies N/V.    O:  Vitals:  Vital Signs Last 24 Hrs  T(C): 36.7 (23 Jun 2019 05:44), Max: 36.9 (22 Jun 2019 13:46)  T(F): 98 (23 Jun 2019 05:44), Max: 98.4 (22 Jun 2019 13:46)  HR: 74 (23 Jun 2019 05:44) (74 - 86)  BP: 121/67 (23 Jun 2019 05:44) (120/65 - 131/75)  BP(mean): --  RR: 17 (23 Jun 2019 05:44) (17 - 18)  SpO2: 99% (23 Jun 2019 05:44) (99% - 100%)    MEDICATIONS  (STANDING):  acetaminophen   Tablet .. 975 milliGRAM(s) Oral <User Schedule>  diphtheria/tetanus/pertussis (acellular) Vaccine (ADAcel) 0.5 milliLiter(s) IntraMuscular once  heparin  Injectable 5000 Unit(s) SubCutaneous every 12 hours  ibuprofen  Tablet. 600 milliGRAM(s) Oral every 6 hours      MEDICATIONS  (PRN):  diphenhydrAMINE 25 milliGRAM(s) Oral every 6 hours PRN Itching  docusate sodium 100 milliGRAM(s) Oral two times a day PRN Stool softening  glycerin Suppository - Adult 1 Suppository(s) Rectal at bedtime PRN Constipation  lanolin Ointment 1 Application(s) Topical every 6 hours PRN Sore Nipples  magnesium hydroxide Suspension 30 milliLiter(s) Oral two times a day PRN Constipation  oxyCODONE    IR 5 milliGRAM(s) Oral every 3 hours PRN Moderate to Severe Pain (4-10)  oxyCODONE    IR 5 milliGRAM(s) Oral once PRN Moderate to Severe Pain (4-10)  simethicone 80 milliGRAM(s) Chew every 4 hours PRN Gas      Labs:  Blood type:   Rubella IgG: RPR: Negative                          8.7<L>   15.44<H> >-----------< 148<L>    ( 06-21 @ 05:30 )             28.2<L>                        8.9<L>   19.87<H> >-----------< 163    ( 06-20 @ 17:25 )             28.9<L>                  PE:  General: NAD  Abdomen: Soft, appropriately tender, incision c/d/i.  Extremities: No erythema, no pitting edema

## 2019-06-23 NOTE — PROGRESS NOTE ADULT - PROBLEM SELECTOR PLAN 1
- Continue regular diet.  - Increase ambulation.  - Continue motrin, tylenol, oxycodone PRN for pain control.    Saeed, pgy2

## 2019-06-24 ENCOUNTER — APPOINTMENT (OUTPATIENT)
Dept: ANTEPARTUM | Facility: CLINIC | Age: 44
End: 2019-06-24

## 2019-06-24 ENCOUNTER — APPOINTMENT (OUTPATIENT)
Dept: ANTEPARTUM | Facility: HOSPITAL | Age: 44
End: 2019-06-24

## 2019-06-24 ENCOUNTER — APPOINTMENT (OUTPATIENT)
Dept: MATERNAL FETAL MEDICINE | Facility: CLINIC | Age: 44
End: 2019-06-24

## 2019-06-24 DIAGNOSIS — O40.3XX0 POLYHYDRAMNIOS, THIRD TRIMESTER, NOT APPLICABLE OR UNSPECIFIED: ICD-10-CM

## 2019-06-24 DIAGNOSIS — O24.415 GESTATIONAL DIABETES MELLITUS IN PREGNANCY, CONTROLLED BY ORAL HYPOGLYCEMIC DRUGS: ICD-10-CM

## 2019-06-24 DIAGNOSIS — O10.013 PRE-EXISTING ESSENTIAL HYPERTENSION COMPLICATING PREGNANCY, THIRD TRIMESTER: ICD-10-CM

## 2019-06-24 DIAGNOSIS — O09.523 SUPERVISION OF ELDERLY MULTIGRAVIDA, THIRD TRIMESTER: ICD-10-CM

## 2019-06-24 DIAGNOSIS — O34.43 MATERNAL CARE FOR OTHER ABNORMALITIES OF CERVIX, THIRD TRIMESTER: ICD-10-CM

## 2019-06-25 DIAGNOSIS — O42.10 PREMATURE RUPTURE OF MEMBRANES, ONSET OF LABOR MORE THAN 24 HOURS FOLLOWING RUPTURE, UNSPECIFIED WEEKS OF GESTATION: ICD-10-CM

## 2019-07-01 ENCOUNTER — APPOINTMENT (OUTPATIENT)
Dept: ANTEPARTUM | Facility: HOSPITAL | Age: 44
End: 2019-07-01

## 2019-07-01 ENCOUNTER — APPOINTMENT (OUTPATIENT)
Dept: ANTEPARTUM | Facility: CLINIC | Age: 44
End: 2019-07-01

## 2022-09-02 NOTE — PROVIDER CONTACT NOTE (OTHER) - SITUATION
Patient is s/p c/s with EBL of 500ml has low urine output for the hour.
 Calcified aorta, increased markings left base of lungs otherwise negative.

## 2023-03-07 NOTE — ED ADULT NURSE NOTE - PAIN: PRESENCE, MLM
denies pain/discomfort Staged Advancement Flap Text: The defect edges were debeveled with a #15 scalpel blade. Given the location of the defect, shape of the defect and the proximity to free margins a staged advancement flap was deemed most appropriate. Using a sterile surgical marker, an appropriate advancement flap was drawn incorporating the defect and placing the expected incisions within the relaxed skin tension lines where possible. The area thus outlined was incised deep to adipose tissue with a #15 scalpel blade. The skin margins were undermined to an appropriate distance in all directions utilizing iris scissors. Following this, the designed flap was placed into the primary defect and sutured into place.